# Patient Record
Sex: FEMALE | Race: OTHER | NOT HISPANIC OR LATINO | Employment: UNEMPLOYED | URBAN - METROPOLITAN AREA
[De-identification: names, ages, dates, MRNs, and addresses within clinical notes are randomized per-mention and may not be internally consistent; named-entity substitution may affect disease eponyms.]

---

## 2022-08-07 LAB
EXTERNAL ABO GROUPING: NORMAL
EXTERNAL ANTIBODY SCREEN: NORMAL
EXTERNAL HEMATOCRIT: 39.5 %
EXTERNAL HEMOGLOBIN: 13.3 G/DL
EXTERNAL HEPATITIS B SURFACE ANTIGEN: NEGATIVE
EXTERNAL HIV-1/2 AB-AG: NORMAL
EXTERNAL PLATELET COUNT: 216 K/ÂΜL
EXTERNAL RH FACTOR: POSITIVE
EXTERNAL RUBELLA IGG QUANTITATION: NORMAL
EXTERNAL SYPHILIS RPR SCREEN: NORMAL

## 2022-10-16 LAB
EXTERNAL HEMATOCRIT: 37 %
EXTERNAL HEMOGLOBIN: 12.1 G/DL
EXTERNAL PLATELET COUNT: 207 K/ÂΜL
GLUCOSE 1H P GLC SERPL-MCNC: 147 MG/DL
GLUCOSE 2H P 75 G GLC PO SERPL-MCNC: 114 MG/DL
GLUCOSE P FAST SERPL-MCNC: 89 MG/DL

## 2022-11-18 ENCOUNTER — HOSPITAL ENCOUNTER (EMERGENCY)
Facility: HOSPITAL | Age: 37
Discharge: HOME | End: 2022-11-18
Attending: EMERGENCY MEDICINE

## 2022-11-18 VITALS
SYSTOLIC BLOOD PRESSURE: 112 MMHG | OXYGEN SATURATION: 99 % | RESPIRATION RATE: 16 BRPM | TEMPERATURE: 97.3 F | DIASTOLIC BLOOD PRESSURE: 55 MMHG | HEART RATE: 85 BPM

## 2022-11-18 DIAGNOSIS — Z3A.30 30 WEEKS GESTATION OF PREGNANCY: Primary | ICD-10-CM

## 2022-11-18 LAB
BACTERIA UR QL AUTO: ABNORMAL /HPF
BILIRUB UR QL STRIP: NEGATIVE
CLARITY UR: CLEAR
COLOR UR: YELLOW
EXT PREGNANCY TEST URINE: POSITIVE
EXT. CONTROL: ABNORMAL
GLUCOSE UR STRIP-MCNC: NEGATIVE MG/DL
HGB UR QL STRIP.AUTO: NEGATIVE
KETONES UR STRIP-MCNC: NEGATIVE MG/DL
LEUKOCYTE ESTERASE UR QL STRIP: ABNORMAL
NITRITE UR QL STRIP: NEGATIVE
NON-SQ EPI CELLS URNS QL MICRO: ABNORMAL /HPF
PH UR STRIP.AUTO: 7 [PH] (ref 4.5–8)
PROT UR STRIP-MCNC: NEGATIVE MG/DL
RBC #/AREA URNS AUTO: ABNORMAL /HPF
SP GR UR STRIP.AUTO: 1.01 (ref 1–1.03)
UROBILINOGEN UR QL STRIP.AUTO: 0.2 E.U./DL
WBC #/AREA URNS AUTO: ABNORMAL /HPF

## 2022-11-18 NOTE — ED NOTES
Pt asking how much longer, she is wanting to leave - advised that per the PA we are waiting on a call from ob/gyn       Marcy Mac RN  11/18/22 1024

## 2022-11-18 NOTE — ED PROVIDER NOTES
History  Chief Complaint   Patient presents with   • Medical Problem     Pt is 29 weeks pregnant  Pt lives in [facility] with  but frequently travels here to [facility]  She needs a pregnancy test to prove to insurance she is pregnant  Denies problems with pregnancy, abdominal pain, pressure, bleeding, or leaking of fluid  Pt presents to the ED stating she needs a pregnancy test for insurance  She is 30 weeks pregnant - has no complaints  Lives some of her time in [facility]-  seeing OBGYN in [facility]  she glucose tolerance test she tested she has no headache  Patient states that she was pregnant once before  She states last year at 25 weeks she had a miscarage - baby had a congenital abnormality - not compatible with life  She states this baby does not have that  She was looking in a no bleeding or leakage of fluids no abdominal pain  A she is not vomiting  Patient has no pain  She states she just needs to prove that she is pregnant to insurance so that she can see OB  She states she already has a follow-up as an outpatient  Prior to Admission Medications   Prescriptions Last Dose Informant Patient Reported? Taking? ASPIRIN LOW DOSE PO   Yes Yes   Sig: Take by mouth Per pt Taking up until 32 weeks of pregnancy per    Calcium Carbonate Antacid (CALCIUM CARBONATE PO)   Yes Yes   Sig: Take by mouth   MAGNESIUM ASPARTATE PO   Yes Yes   Sig: Take by mouth   Prenatal Vit-Fe Fumarate-FA (PRENATAL VITAMINS PO)   Yes Yes   Sig: Take 1 tablet by mouth daily      Facility-Administered Medications: None       History reviewed  No pertinent past medical history  History reviewed  No pertinent surgical history  History reviewed  No pertinent family history  I have reviewed and agree with the history as documented      E-Cigarette/Vaping   • E-Cigarette Use Never User      E-Cigarette/Vaping Substances     Social History     Tobacco Use   • Smoking status: Never   • Smokeless tobacco: Never   Vaping Use   • Vaping Use: Never used   Substance Use Topics   • Alcohol use: Never   • Drug use: Never       Review of Systems   Gastrointestinal: Negative for abdominal pain  Genitourinary: Negative for dysuria, pelvic pain, vaginal bleeding, vaginal discharge and vaginal pain  All other systems reviewed and are negative  Physical Exam  Physical Exam  Vitals and nursing note reviewed  Constitutional:       Appearance: She is well-developed and well-nourished  HENT:      Head: Normocephalic and atraumatic  Right Ear: Tympanic membrane and external ear normal       Left Ear: Tympanic membrane and external ear normal       Mouth/Throat:      Mouth: Oropharynx is clear and moist    Eyes:      Extraocular Movements: EOM normal       Conjunctiva/sclera: Conjunctivae normal    Cardiovascular:      Rate and Rhythm: Normal rate and regular rhythm  Pulses: Intact distal pulses  Heart sounds: Normal heart sounds  Pulmonary:      Effort: Pulmonary effort is normal       Breath sounds: Normal breath sounds  Abdominal:      General: Bowel sounds are normal       Palpations: Abdomen is soft  Comments: Pt is pregnant, uterus is above umbilicus - consistent with pts report of being 30 weeks  Musculoskeletal:         General: Normal range of motion  Cervical back: Neck supple  Lymphadenopathy:      Cervical: No cervical adenopathy  Skin:     General: Skin is warm  Findings: No rash  Neurological:      Mental Status: She is alert     Psychiatric:         Mood and Affect: Mood and affect normal          Behavior: Behavior normal          Vital Signs  ED Triage Vitals [11/18/22 0920]   Temperature Pulse Respirations Blood Pressure SpO2   (!) 97 3 °F (36 3 °C) 85 16 112/55 99 %      Temp Source Heart Rate Source Patient Position - Orthostatic VS BP Location FiO2 (%)   Oral Monitor Sitting Right arm --      Pain Score       No Pain           Vitals:    11/18/22 0920   BP: 112/55   Pulse: 85 Patient Position - Orthostatic VS: Sitting         Visual Acuity      ED Medications  Medications - No data to display    Diagnostic Studies  Results Reviewed     Procedure Component Value Units Date/Time    Urine Microscopic [[id number]]  (Abnormal) Collected: 11/18/22 0950    Lab Status: Final result Specimen: Urine, Clean Catch Updated: 11/18/22 1159     RBC, UA None Seen /hpf      WBC, UA 1-2 /hpf      Epithelial Cells Occasional /hpf      Bacteria, UA Occasional /hpf     Urine culture [[id number]] Collected: 11/18/22 0950    Lab Status: In process Specimen: Urine, Clean Catch Updated: 11/18/22 1014    POCT pregnancy, urine [[id number]]  (Abnormal) Resulted: 11/18/22 1000    Lab Status: Final result Updated: 11/18/22 1000     EXT Preg Test, Ur Positive     Control Valid    Urine Macroscopic, POC [[id number]]  (Abnormal) Collected: 11/18/22 0950    Lab Status: Final result Specimen: Urine Updated: 11/18/22 0952     Color, UA Yellow     Clarity, UA Clear     pH, UA 7 0     Leukocytes, UA Trace     Nitrite, UA Negative     Protein, UA Negative mg/dl      Glucose, UA Negative mg/dl      Ketones, UA Negative mg/dl      Urobilinogen, UA 0 2 E U /dl      Bilirubin, UA Negative     Occult Blood, UA Negative     Specific Gravity, UA 1 015    Narrative:      CLINITEK RESULT                 No orders to display              Procedures  Procedures         ED Course  ED Course as of 11/18/22 1231   Fri Nov 18, 2022   1021 Discussed with DR Girma Aldana - ok to FU as out pt - nothing more needed here today  SBIRT 20yo+    Flowsheet Row Most Recent Value   SBIRT (23 yo +)    In order to provide better care to our patients, we are screening all of our patients for alcohol and drug use  Would it be okay to ask you these screening questions? Yes Filed at: 11/18/2022 1007   Initial Alcohol Screen: US AUDIT-C     1  How often do you have a drink containing alcohol? 0 Filed at: 11/18/2022 1007   2   How many drinks containing alcohol do you have on a typical day you are drinking? 0 Filed at: 11/18/2022 1007   3a  Male UNDER 65: How often do you have five or more drinks on one occasion? 0 Filed at: 11/18/2022 1007   3b  FEMALE Any Age, or MALE 65+: How often do you have 4 or more drinks on one occassion? 0 Filed at: 11/18/2022 1007   Audit-C Score 0 Filed at: 11/18/2022 1007   JACQUI: How many times in the past year have you    Used an illegal drug or used a prescription medication for non-medical reasons? Never Filed at: 11/18/2022 1007                    MDM  Number of Diagnoses or Management Options  30 weeks gestation of pregnancy: new and requires workup     Amount and/or Complexity of Data Reviewed  Clinical lab tests: reviewed  Discuss the patient with other providers: yes ( [address] [place] [address])    Risk of Complications, Morbidity, and/or Mortality  General comments: Instructions reviewed  Patient Progress  Patient progress: stable      Disposition  Final diagnoses:   30 weeks gestation of pregnancy     Time reflects when diagnosis was documented in both MDM as applicable and the Disposition within this note     Time User Action Codes Description Comment    11/18/2022  9:54 AM Candelaria Bui Add [Z3A 30] 30 weeks gestation of pregnancy       ED Disposition     ED Disposition   Discharge    Condition   Stable    Date/Time   Fri Nov 18, 2022  9:54 AM    Darrell Hunter discharge to home/self care  Follow-up Information     Follow up With Specialties Details Why Contact Info    Warren Bahena MD Obstetrics and Gynecology, Obstetrics, Gynecology   [address] [facility] [id number] [phone]            There are no discharge medications for this patient  No discharge procedures on file      PDMP Review     None          ED Provider  Electronically Signed by           Gail Harper PA-C  11/18/22 1233

## 2022-11-18 NOTE — Clinical Note
Josh Canales was seen and treated in our emergency department on 11/18/2022  Diagnosis:     Caryn Srivastava    She may return on this date:     Caryn Srivastava is pregnant - with good fetal heart  tones  If you have any questions or concerns, please don't hesitate to call        Candelaria Bui PA-C    ______________________________           _______________          _______________  Hospital Representative                              Date                                Time

## 2022-11-20 LAB — BACTERIA UR CULT: NORMAL

## 2022-11-30 ENCOUNTER — OFFICE VISIT (OUTPATIENT)
Dept: OBGYN CLINIC | Facility: CLINIC | Age: 37
End: 2022-11-30

## 2022-11-30 VITALS
SYSTOLIC BLOOD PRESSURE: 110 MMHG | BODY MASS INDEX: 29.28 KG/M2 | WEIGHT: 182.2 LBS | HEIGHT: 66 IN | DIASTOLIC BLOOD PRESSURE: 60 MMHG

## 2022-11-30 DIAGNOSIS — O28.5 ABNORMAL CHROMOSOMAL AND GENETIC FINDING ON ANTENATAL SCREENING MOTHER: ICD-10-CM

## 2022-11-30 DIAGNOSIS — Z12.4 PAP SMEAR FOR CERVICAL CANCER SCREENING: ICD-10-CM

## 2022-11-30 DIAGNOSIS — O09.523 ADVANCED MATERNAL AGE IN MULTIGRAVIDA, THIRD TRIMESTER: Primary | ICD-10-CM

## 2022-11-30 DIAGNOSIS — O34.219 HISTORY OF CESAREAN DELIVERY, ANTEPARTUM: ICD-10-CM

## 2022-11-30 DIAGNOSIS — Z23 NEED FOR INFLUENZA VACCINATION: ICD-10-CM

## 2022-11-30 DIAGNOSIS — Z23 NEED FOR TDAP VACCINATION: ICD-10-CM

## 2022-11-30 LAB — EXTERNAL CHLAMYDIA SCREEN: NEGATIVE

## 2022-11-30 NOTE — PROGRESS NOTES
Pt is a 40 y o   with Patient's last menstrual period was 2022  who presents for prenatal care  [facility] 2022  She has been receiving prenatal are in [facility] until 2022  She will be here for the duration of her pregnancy  Her LMP was normal, on time and without hormonal disruption  Pregnancy was intended and achieved spontaneously  Her PMHx is unremarkable  Her prior pregnancy was complicated by child with posterior urethral valves, anhydramnios, renal edema and brain edema  Pt had an elective c/s at 25 weeks and the fetus  shortly thereafter  This was performed in [place] and the patient does not have record of her operative report  She reports her physician counseled her this was the best course of action as the baby did not have a good chance at survival  Reviewed with patient that at 25 weeks without labor, it is unlikely that she had a low transverse incision  Advised patient to try to obtain copy of this operative report, but without out, I recommend repeat  section  Will sent for M referral to discuss the same and timing of   She denies history of genital HSV; her partner denies history of genital HSV  Her ethnic background is  [place] ; his is [place]  There is no consanguinity  There is population risk for CF-declines testing hemoglobinopathy/thalassemia-declines testing until she has insurance  Her family history is notable for nothing unusual  His is notable for a cousin with a mental disorder, but there was consanguinity  Pt reports she had trisomy screening-reports she had an NIPT  Pt presents result on her cell phone-2022 when she was 14+6 weeks  Negative for trisomy 13, 18, 21  High risk of XYY noted  7% fetal cells noted  Will refer to M for the same  Pt does accept blood products if need arises  She reports +FM  Denies vb, lof, ctx  Agreeable to tdap and influenza vaccination today       Past Medical History:   Diagnosis Date   • Canker sores oral    • Chickenpox    • Pap smear for cervical cancer screening     2015-wnl per pt; 2022   • Seasonal allergies        Past Surgical History:   Procedure Laterality Date   •  SECTION  2021     shortly after birth--elective C/S at 22 weks due to multiple medical problems with fetus  • WISDOM TOOTH EXTRACTION               Current Outpatient Medications:   •  ASPIRIN LOW DOSE PO, Take by mouth Per pt Taking up until 32 weeks of pregnancy per Cindy Denise: , Rfl:   •  Calcium Carbonate Antacid (CALCIUM CARBONATE PO), Take by mouth, Disp: , Rfl:   •  Ferrous Sulfate (IRON PO), Take by mouth, Disp: , Rfl:   •  MAGNESIUM ASPARTATE PO, Take by mouth, Disp: , Rfl:   •  Prenatal Vit-Fe Fumarate-FA (PRENATAL VITAMINS PO), Take 1 tablet by mouth daily, Disp: , Rfl:     No Known Allergies    OB History    Para Term  AB Living   2 1   1   0   SAB IAB Ectopic Multiple Live Births           1      # Outcome Date GA Lbr Santy/2nd Weight Sex Delivery Anes PTL Lv   2 Current            1  21    M CS-Unspec  N ND      Obstetric Comments   Menarche: 13-      Menses: 25-28/5-6/regular pad every 5 hours       Social History     Socioeconomic History   • Marital status: /Civil Union     Spouse name: Sherwin Johnson   • Number of children: 0   • Years of education: None   • Highest education level:  Bachelor's degree (e g , BA, AB, BS)   Occupational History   • Occupation:  in [employer]   Tobacco Use   • Smoking status: Never   • Smokeless tobacco: Former   • Tobacco comments:     Hookah prior to pregnancy   Vaping Use   • Vaping Use: Never used   Substance and Sexual Activity   • Alcohol use: Not Currently     Comment: none witih pregnancy, rarely prior   • Drug use: Never   • Sexual activity: Yes     Partners: Male     Birth control/protection: None     Comment: lifetime partners: 1   Other Topics Concern   • None   Social History Narrative Jew: [employer]     Social Determinants of Health     Financial Resource Strain: Not on file   Food Insecurity: Not on file   Transportation Needs: Not on file   Physical Activity: Not on file   Stress: Not on file   Social Connections: Not on file   Intimate Partner Violence: Not on file   Housing Stability: Not on file       Family History   Problem Relation Age of Onset   • Hypertension Mother    • Hyperlipidemia Mother    • Heart disease Father         CABG   • Supraventricular tachycardia Father         required ablation   • No Known Problems Sister    • No Known Problems Brother    • Hypertension Maternal Grandmother    • Deafness Maternal Grandmother         elderly--age 80   • Dementia Maternal Grandmother    • Hypertension Maternal Grandfather    • Prostate cancer Maternal Grandfather    • Hypertension Paternal Grandmother    • Hyperlipidemia Paternal Grandmother    • Diabetes type II Paternal Grandmother    • Heart attack Paternal Grandfather    • Stroke Paternal Grandfather         with paralysis   • Breast cancer Neg Hx    • Ovarian cancer Neg Hx    • Colon cancer Neg Hx        Review of Systems   Constitutional: Negative for chills, fatigue, fever and unexpected weight change  HENT: Negative for congestion, mouth sores and sore throat  Respiratory: Negative for cough, chest tightness, shortness of breath and wheezing  Cardiovascular: Negative for chest pain and palpitations  Gastrointestinal: Negative for abdominal distention, abdominal pain, constipation, diarrhea, nausea and vomiting  Endocrine: Negative for cold intolerance and heat intolerance  Genitourinary: Negative for dyspareunia, dysuria, genital sores, menstrual problem, pelvic pain, vaginal bleeding, vaginal discharge and vaginal pain  Musculoskeletal: Negative for arthralgias  Skin: Negative for color change and rash  Neurological: Negative for dizziness, light-headedness and headaches  Hematological: Negative for adenopathy  Blood pressure 110/60, height 5' 6" (1 676 m), weight 82 6 kg (182 lb 3 2 oz), last menstrual period 2022  and Body mass index is 29 41 kg/m²  Physical Exam  Constitutional:       General: She is not in acute distress  Appearance: Normal appearance  She is not ill-appearing  HENT:      Head: Normocephalic and atraumatic  Eyes:      Extraocular Movements: Extraocular movements intact  Conjunctiva/sclera: Conjunctivae normal    Pulmonary:      Effort: Pulmonary effort is normal    Abdominal:      General: Bowel sounds are normal  There is distension (Gravid)  Palpations: Abdomen is soft  Tenderness: There is no abdominal tenderness  There is no guarding or rebound  Hernia: No hernia is present  Comments: Fundal Height 31 cm  +FHT by doppler   Musculoskeletal:         General: Normal range of motion  Cervical back: Normal range of motion  Skin:     General: Skin is warm  Findings: No erythema or rash  Neurological:      Mental Status: She is alert and oriented to person, place, and time  Psychiatric:         Mood and Affect: Mood normal          Behavior: Behavior normal          Thought Content: Thought content normal          Judgment: Judgment normal           vulva: normal external genitalia for age and no lesions, masses, epithelial changes, or exudate  vagina: color pink and rugae  well formed rugae  cervix: nullip, no lesions , friable and pap obtained  uterus: NSSC, AF, NT, mobile and 31 cm  adnexa: not palpable       A/P: Pt is a 40 y o   with    Pt has been counseled re diet, exercise, weight gain, foods to avoid, BF, vaccines in pregnancy, trisomy screening, vector borne illness and prevention, routine dental care, travel precautions to include seat belt use and VTE risk reduction    She has been provided our pregnancy packet which includes how and when to contact providers, medication recommendations, dietary suggestions, breastfeeding information as well as websites for additional information, hospital and delivery concerns, etc  Patient also given 3d trimester packet for review  She brings her prenatal records for me to review  I will order any blood work that has not been done yet and contact patient with those orders  Debbi Fallon was seen today for routine prenatal visit  Diagnoses and all orders for this visit:    Advanced maternal age in multigravida, third trimester  -     Chlamydia/GC amplified DNA by PCR  -     Ambulatory Referral to Maternal Fetal Medicine; Future    History of  delivery, antepartum  -     Chlamydia/GC amplified DNA by PCR  -     Ambulatory Referral to Maternal Fetal Medicine; Future    Abnormal chromosomal and genetic finding on  screening mother  -     Chlamydia/GC amplified DNA by PCR  -     Ambulatory Referral to Maternal Fetal Medicine; Future    Pap smear for cervical cancer screening  -     Liquid-based pap, screening    Need for influenza vaccination  -     influenza vaccine, quadrivalent, 0 5 mL, preservative-free, for adult and pediatric patients 6 mos+ (AFLURIA, FLUARIX, FLULAVAL, FLUZONE)    Need for Tdap vaccination  -     Tdap Vaccine greater than or equal to 6yo      Review of prenatal records that patient brings reveal:   LMP 2022-->EDC 2023    Ultrasound 2022-NT 1 9 mm, Nasal bone present, CRL 12 week 4 Days, BPD 12 weeks 2  Days, Anterior placenta; Cervix 3 7 cm  (BRENDA 2023 by u/s, 12 wee 3 days; no change in [facility])    Ultrasound: -IGYLSSYO anterior, Umbilical cord wnl, LVP 3 3 cm; Fetal biometry-see scanned image, 16 weeks 5 days  Early anatomy wnl  Ultrasound 2022-Fetus measures 22 weeks 2 days, anterior placenta, LVP 5 1 cm, anatomy wnl    Ultrasound 11/10/2022-cephalic, EFW 0260T, LVP 6 cm, cervical length 4 3 cm  Umbilical dopplers wnl, MCA wnl, Fetus measures 29 weeks 4 days   Mild high resistance flow of right uterine artery  Labs:  Urine culture: 10/19/2022-no growth, U/A 10/16/2022-wnl  2 hour GTT 10/16/2022-FBG 89; 1 hour 147; 2 hour 114 (pt given 75 g glucose test)  10/16/2022-Hgb 12 1; hct 37; Plt 207  8/7/2022-FBG 85; Hgb 13 3; hct 39 5; fla592; TSH 2 09; HIV non reactive;  Hep B negative, Rubella immune, RPR non reactive, Hep C Ab non reactive; blood type A positive antibody negative; urine culture negative; vitamin D 24 5;

## 2022-12-02 LAB
HPV HR 12 DNA CVX QL NAA+PROBE: NEGATIVE
HPV16 DNA CVX QL NAA+PROBE: NEGATIVE
HPV18 DNA CVX QL NAA+PROBE: NEGATIVE

## 2022-12-03 LAB
C TRACH DNA SPEC QL NAA+PROBE: NEGATIVE
N GONORRHOEA DNA SPEC QL NAA+PROBE: NEGATIVE

## 2022-12-13 LAB
LAB AP GYN PRIMARY INTERPRETATION: NORMAL
Lab: NORMAL

## 2022-12-16 ENCOUNTER — ROUTINE PRENATAL (OUTPATIENT)
Dept: OBGYN CLINIC | Facility: CLINIC | Age: 37
End: 2022-12-16

## 2022-12-16 VITALS
SYSTOLIC BLOOD PRESSURE: 126 MMHG | BODY MASS INDEX: 30.05 KG/M2 | HEIGHT: 66 IN | WEIGHT: 187 LBS | DIASTOLIC BLOOD PRESSURE: 74 MMHG

## 2022-12-16 DIAGNOSIS — O09.523 ADVANCED MATERNAL AGE IN MULTIGRAVIDA, THIRD TRIMESTER: Primary | ICD-10-CM

## 2022-12-16 DIAGNOSIS — O28.5 ABNORMAL CHROMOSOMAL AND GENETIC FINDING ON ANTENATAL SCREENING MOTHER: ICD-10-CM

## 2022-12-16 DIAGNOSIS — O34.219 HISTORY OF CESAREAN DELIVERY, ANTEPARTUM: ICD-10-CM

## 2022-12-16 NOTE — PROGRESS NOTES
41 yo  at 35w gestation  Late transfer of care from [facility]  AMA ,Hx of poor OB outcome (anomalies w/ c/s at 25w, fetal demise); High risk of XYY noted on NIPT  She has a copy of what her OB from [place] wrote regarding her prior   She translated it to English:  "Uterine incision was crescent in the lower segment, but at this gestation age it is not formed as required  It is possible to undergo a natural birth experience, provided the labor is not violent and it is preferrable to resort to  section when there is any defect in the development of labor "  I requested she forward doctor Pennie Rizo the original document via [employer]  Baby is active, denies leakage of fluid, vaginal bleeding or uterine contractions  S=D, normal FHTs, normal BP    To stop ASA in one week  Patient is inquiring about delivery planning/ date  Advised that Dr Pennie Rizo will clarify this with her after reviewing  information from her prior pregnancy  RV one week-- GBS at that time

## 2022-12-19 ENCOUNTER — ROUTINE PRENATAL (OUTPATIENT)
Facility: HOSPITAL | Age: 37
End: 2022-12-19
Attending: OBSTETRICS & GYNECOLOGY

## 2022-12-19 VITALS
SYSTOLIC BLOOD PRESSURE: 116 MMHG | BODY MASS INDEX: 30.18 KG/M2 | WEIGHT: 187.8 LBS | DIASTOLIC BLOOD PRESSURE: 54 MMHG | HEIGHT: 66 IN

## 2022-12-19 DIAGNOSIS — O34.219 HISTORY OF CESAREAN DELIVERY, ANTEPARTUM: ICD-10-CM

## 2022-12-19 DIAGNOSIS — Z3A.33 33 WEEKS GESTATION OF PREGNANCY: Primary | ICD-10-CM

## 2022-12-19 DIAGNOSIS — O28.5 ABNORMAL CHROMOSOMAL AND GENETIC FINDING ON ANTENATAL SCREENING MOTHER: ICD-10-CM

## 2022-12-19 DIAGNOSIS — O09.523 ADVANCED MATERNAL AGE IN MULTIGRAVIDA, THIRD TRIMESTER: ICD-10-CM

## 2022-12-19 DIAGNOSIS — Z36.89 ENCOUNTER FOR FETAL ANATOMIC SURVEY: ICD-10-CM

## 2022-12-19 PROBLEM — Z3A.35 35 WEEKS GESTATION OF PREGNANCY: Status: ACTIVE | Noted: 2022-12-19

## 2022-12-19 NOTE — LETTER
2022     Maria Elena Murphy, [address] [address]    Patient: Nina Howard   YOB: 1985   Date of Visit: 2022       Dear Dr Radha Delgado and Deepak Alex,    Thank you for referring Nina Howard to me for evaluation  Below are my notes for this consultation  If you have questions, please do not hesitate to call me  I look forward to following your patient along with you  Sincerely,        Jennie Gardner MD        CC: PETRA Bazan MD  2022  4:57 PM  Sign when Signing Visit  [address]: Ms Jaclyn Landon was seen today at 33w4d for anatomic survey ultrasound  See ultrasound report under "OB Procedures" tab  My recommendations are as follows:  1  Although encouraging, even a normal-appearing ultrasound cannot exclude all malformations, or the possibility of a genetic syndrome  Post-enoc evaluation is advised by the pediatrician regarding the abnormal NIPS result  No further ultrasounds were scheduled at this time  Please refer her back to our office as clinically indicated  2  Regarding her prior  , the language in the operative report as well as the gestational age at delivery suggest extension of the hysterotomy into the active segment of the uterus  I recommend delivery via repeat  between 36 0/7 and 37 0/7 weeks gestation, analogous to a patient with a prior classical   If delivery is planned prior to 37 weeks, late  steroids should be offered, but are not required to proceed with delivery prior to 37w0d       Please don't hesitate to contact our office with any concerns or questions     -Jennie Gardner MD

## 2022-12-19 NOTE — PROGRESS NOTES
[address] [place] [address]: Ms Eunice Payan was seen today at 33w4d for anatomic survey ultrasound  See ultrasound report under "OB Procedures" tab  My recommendations are as follows:  1  Although encouraging, even a normal-appearing ultrasound cannot exclude all malformations, or the possibility of a genetic syndrome  Post- evaluation is advised by the pediatrician regarding the abnormal NIPS result  No further ultrasounds were scheduled at this time  Please refer her back to our office as clinically indicated  2  Regarding her prior  , the language in the operative report as well as the gestational age at delivery suggest extension of the hysterotomy into the active segment of the uterus  I recommend delivery via repeat  between 36 0/7 and 37 0/7 weeks gestation, analogous to a patient with a prior classical   If delivery is planned prior to 37 weeks, late  steroids should be offered, but are not required to proceed with delivery prior to 37w0d       Please don't hesitate to contact our office with any concerns or questions     -Sofy Stafford MD

## 2022-12-30 ENCOUNTER — ROUTINE PRENATAL (OUTPATIENT)
Dept: OBGYN CLINIC | Facility: CLINIC | Age: 37
End: 2022-12-30

## 2022-12-30 VITALS
SYSTOLIC BLOOD PRESSURE: 112 MMHG | WEIGHT: 191.2 LBS | BODY MASS INDEX: 30.73 KG/M2 | HEIGHT: 66 IN | DIASTOLIC BLOOD PRESSURE: 62 MMHG

## 2022-12-30 DIAGNOSIS — Z3A.35 35 WEEKS GESTATION OF PREGNANCY: ICD-10-CM

## 2022-12-30 DIAGNOSIS — O34.219 HISTORY OF CESAREAN DELIVERY, ANTEPARTUM: ICD-10-CM

## 2022-12-30 DIAGNOSIS — O09.523 ADVANCED MATERNAL AGE IN MULTIGRAVIDA, THIRD TRIMESTER: Primary | ICD-10-CM

## 2022-12-30 DIAGNOSIS — O28.5 ABNORMAL CHROMOSOMAL AND GENETIC FINDING ON ANTENATAL SCREENING MOTHER: ICD-10-CM

## 2022-12-30 NOTE — PROGRESS NOTES
39 yo  at 35w1d gestation  Late transfer of care from [facility]  AMA ,Hx of poor OB outcome (anomalies w/ c/s at 25w, fetal demise); High risk of XYY noted on NIPT  Notes an increase in discharge  Feels "numbness" in pubic area-- I checked area and she has some vulvar varicosities; reassured  Having a lot cramps that are irregular  Baby is active, no bleeding  S=D, normal FHTs, nl BP    GBS obtained  Pt needs repeat c/s 36-37 weeks gestation due to prior c/s at 25 weeks with operative note stating GAURAV was not well developed   has been scheduled for 23    RV one week

## 2023-01-01 LAB — GP B STREP DNA SPEC QL NAA+PROBE: NEGATIVE

## 2023-01-06 ENCOUNTER — ROUTINE PRENATAL (OUTPATIENT)
Dept: OBGYN CLINIC | Facility: CLINIC | Age: 38
End: 2023-01-06

## 2023-01-06 VITALS
HEIGHT: 66 IN | SYSTOLIC BLOOD PRESSURE: 118 MMHG | HEART RATE: 94 BPM | BODY MASS INDEX: 31.18 KG/M2 | DIASTOLIC BLOOD PRESSURE: 70 MMHG | WEIGHT: 194 LBS

## 2023-01-06 DIAGNOSIS — Z01.818 PREOPERATIVE TESTING: Primary | ICD-10-CM

## 2023-01-06 DIAGNOSIS — O28.5 ABNORMAL CHROMOSOMAL AND GENETIC FINDING ON ANTENATAL SCREENING MOTHER: ICD-10-CM

## 2023-01-06 DIAGNOSIS — Z3A.36 36 WEEKS GESTATION OF PREGNANCY: ICD-10-CM

## 2023-01-06 DIAGNOSIS — O34.219 HISTORY OF CESAREAN DELIVERY, ANTEPARTUM: ICD-10-CM

## 2023-01-06 DIAGNOSIS — O09.523 ADVANCED MATERNAL AGE IN MULTIGRAVIDA, THIRD TRIMESTER: ICD-10-CM

## 2023-01-06 NOTE — PROGRESS NOTES
Pt is a 40 y o   36w1d  Pregnancy is complicated by  AMA ,Hx of poor OB outcome (anomalies w/ c/s at 27w, fetal demise); High risk of XYY noted on NIPT  Pt reports +FM  Denies vb, lof, ctx   PTL precautions and fkc reviewed  Due to mid transverse incision at previous c/s--C/S scheduled on 2023 at 9 am  Pt instructed to arrive at 7 am  No food or drink after midnight the evening prior  Preop lab slips given to patient  Delivery consent reviewed and signed  Called [facility] while pt present and scheduled BMZ for FLM on Monday and Tuesday next week on  and 1/10

## 2023-01-08 ENCOUNTER — APPOINTMENT (OUTPATIENT)
Dept: LAB | Facility: HOSPITAL | Age: 38
End: 2023-01-08

## 2023-01-08 DIAGNOSIS — Z01.818 PREOPERATIVE TESTING: Primary | ICD-10-CM

## 2023-01-08 LAB
ABO GROUP BLD: NORMAL
BLD GP AB SCN SERPL QL: NEGATIVE
ERYTHROCYTE [DISTWIDTH] IN BLOOD BY AUTOMATED COUNT: 13.8 % (ref 11.6–15.1)
HCT VFR BLD AUTO: 36.5 % (ref 34.8–46.1)
HGB BLD-MCNC: 12.3 G/DL (ref 11.5–15.4)
MCH RBC QN AUTO: 31.7 PG (ref 26.8–34.3)
MCHC RBC AUTO-ENTMCNC: 33.7 G/DL (ref 31.4–37.4)
MCV RBC AUTO: 94 FL (ref 82–98)
PLATELET # BLD AUTO: 158 THOUSANDS/UL (ref 149–390)
PMV BLD AUTO: 11.1 FL (ref 8.9–12.7)
RBC # BLD AUTO: 3.88 MILLION/UL (ref 3.81–5.12)
RH BLD: POSITIVE
SPECIMEN EXPIRATION DATE: NORMAL
WBC # BLD AUTO: 14.08 THOUSAND/UL (ref 4.31–10.16)

## 2023-01-09 ENCOUNTER — ANESTHESIA EVENT (INPATIENT)
Dept: LABOR AND DELIVERY | Facility: HOSPITAL | Age: 38
End: 2023-01-09

## 2023-01-09 ENCOUNTER — CLINICAL SUPPORT (OUTPATIENT)
Dept: PERINATAL CARE | Facility: OTHER | Age: 38
End: 2023-01-09

## 2023-01-09 VITALS
DIASTOLIC BLOOD PRESSURE: 60 MMHG | HEART RATE: 87 BPM | SYSTOLIC BLOOD PRESSURE: 102 MMHG | HEIGHT: 66 IN | WEIGHT: 195 LBS | BODY MASS INDEX: 31.34 KG/M2

## 2023-01-09 DIAGNOSIS — Z3A.36 36 WEEKS GESTATION OF PREGNANCY: Primary | ICD-10-CM

## 2023-01-09 DIAGNOSIS — Z98.891 HISTORY OF CLASSICAL CESAREAN SECTION: ICD-10-CM

## 2023-01-09 LAB — RPR SER QL: NORMAL

## 2023-01-09 RX ORDER — BETAMETHASONE SODIUM PHOSPHATE AND BETAMETHASONE ACETATE 3; 3 MG/ML; MG/ML
12 INJECTION, SUSPENSION INTRA-ARTICULAR; INTRALESIONAL; INTRAMUSCULAR; SOFT TISSUE EVERY 24 HOURS
Status: DISCONTINUED | OUTPATIENT
Start: 2023-01-09 | End: 2023-01-10

## 2023-01-09 RX ADMIN — BETAMETHASONE SODIUM PHOSPHATE AND BETAMETHASONE ACETATE 12 MG: 3; 3 INJECTION, SUSPENSION INTRA-ARTICULAR; INTRALESIONAL; INTRAMUSCULAR at 14:20

## 2023-01-09 NOTE — PROGRESS NOTES
First Beta injection was performed 12 mg of Betamethasone R Ventrogluteal  This was well tolerated by patient  Aware to call with any S/E or concerns  Patient is scheduled for 2nd dose tomorrow 1/10/23

## 2023-01-10 ENCOUNTER — CLINICAL SUPPORT (OUTPATIENT)
Dept: PERINATAL CARE | Facility: OTHER | Age: 38
End: 2023-01-10

## 2023-01-10 VITALS
SYSTOLIC BLOOD PRESSURE: 120 MMHG | HEART RATE: 88 BPM | WEIGHT: 195 LBS | DIASTOLIC BLOOD PRESSURE: 64 MMHG | BODY MASS INDEX: 31.47 KG/M2

## 2023-01-10 DIAGNOSIS — Z3A.36 36 WEEKS GESTATION OF PREGNANCY: Primary | ICD-10-CM

## 2023-01-10 DIAGNOSIS — O34.219 HISTORY OF CESAREAN DELIVERY, ANTEPARTUM: ICD-10-CM

## 2023-01-10 RX ADMIN — BETAMETHASONE SODIUM PHOSPHATE AND BETAMETHASONE ACETATE 12 MG: 3; 3 INJECTION, SUSPENSION INTRA-ARTICULAR; INTRALESIONAL; INTRAMUSCULAR at 11:55

## 2023-01-10 NOTE — PROGRESS NOTES
Discussed with patient her MFM provider prescribed Betamethasone (  steroid)  Betamethasone dose of 12 mg is given in two injections approximately 24 hours apart  Some common side effects of Betamethasone include: burning, itching, increase in blood sugar levels, trembling, weakness, fast heart beat, mood changes or pain /redness at injection site  Patient advised to seek emergency help if she experiences any signs or symptoms of an allergic reaction : hives, difficulty breathing, swelling of face, lips, tongue or throat    Patient advised to contact her OB if she notices any changes in pregnancy or fetal movements ( kick counts)    Patient verbalized understanding of all information  Betamethasone injection given L ventrogluteal site

## 2023-01-11 ENCOUNTER — HOSPITAL ENCOUNTER (INPATIENT)
Facility: HOSPITAL | Age: 38
LOS: 2 days | Discharge: HOME | End: 2023-01-13
Attending: OBSTETRICS & GYNECOLOGY | Admitting: OBSTETRICS & GYNECOLOGY

## 2023-01-11 ENCOUNTER — ANESTHESIA (INPATIENT)
Dept: LABOR AND DELIVERY | Facility: HOSPITAL | Age: 38
End: 2023-01-11

## 2023-01-11 DIAGNOSIS — Z3A.36 36 WEEKS GESTATION OF PREGNANCY: ICD-10-CM

## 2023-01-11 DIAGNOSIS — O34.219 HISTORY OF CESAREAN DELIVERY, ANTEPARTUM: ICD-10-CM

## 2023-01-11 DIAGNOSIS — Z98.891 S/P REPEAT LOW TRANSVERSE C-SECTION: Primary | ICD-10-CM

## 2023-01-11 LAB
BASE EXCESS BLDCOA CALC-SCNC: -0.3 MMOL/L (ref 3–11)
BASE EXCESS BLDCOV CALC-SCNC: -0.5 MMOL/L (ref 1–9)
DME PARACHUTE DELIVERY DATE REQUESTED: NORMAL
DME PARACHUTE ITEM DESCRIPTION: NORMAL
DME PARACHUTE ORDER STATUS: NORMAL
DME PARACHUTE SUPPLIER NAME: NORMAL
DME PARACHUTE SUPPLIER PHONE: NORMAL
HCO3 BLDCOA-SCNC: 25.6 MMOL/L (ref 17.3–27.3)
HCO3 BLDCOV-SCNC: 24.4 MMOL/L (ref 12.2–28.6)
O2 CT VFR BLDCOA CALC: 9.4 ML/DL
OXYHGB MFR BLDCOA: 48 %
OXYHGB MFR BLDCOV: 69.8 %
PCO2 BLDCOA: 46.4 MM[HG] (ref 30–60)
PCO2 BLDCOV: 41.2 MM HG (ref 27–43)
PH BLDCOA: 7.36 [PH] (ref 7.23–7.43)
PH BLDCOV: 7.39 [PH] (ref 7.19–7.49)
PO2 BLDCOA: 21.3 MM HG (ref 5–25)
PO2 BLDCOV: 28.7 MM HG (ref 15–45)
SAO2 % BLDCOV: 13.3 ML/DL

## 2023-01-11 PROCEDURE — 4A1HXCZ MONITORING OF PRODUCTS OF CONCEPTION, CARDIAC RATE, EXTERNAL APPROACH: ICD-10-PCS | Performed by: OBSTETRICS & GYNECOLOGY

## 2023-01-11 RX ORDER — MIDAZOLAM HYDROCHLORIDE 2 MG/2ML
INJECTION, SOLUTION INTRAMUSCULAR; INTRAVENOUS
Status: COMPLETED
Start: 2023-01-11 | End: 2023-01-11

## 2023-01-11 RX ORDER — CALCIUM CARBONATE 200(500)MG
1000 TABLET,CHEWABLE ORAL DAILY PRN
Status: DISCONTINUED | OUTPATIENT
Start: 2023-01-11 | End: 2023-01-13 | Stop reason: HOSPADM

## 2023-01-11 RX ORDER — OXYTOCIN/RINGER'S LACTATE 30/500 ML
PLASTIC BAG, INJECTION (ML) INTRAVENOUS
Status: COMPLETED
Start: 2023-01-11 | End: 2023-01-11

## 2023-01-11 RX ORDER — CEFAZOLIN SODIUM 2 G/50ML
2000 SOLUTION INTRAVENOUS ONCE
Status: COMPLETED | OUTPATIENT
Start: 2023-01-11 | End: 2023-01-11

## 2023-01-11 RX ORDER — ONDANSETRON 2 MG/ML
4 INJECTION INTRAMUSCULAR; INTRAVENOUS ONCE AS NEEDED
Status: DISCONTINUED | OUTPATIENT
Start: 2023-01-11 | End: 2023-01-11

## 2023-01-11 RX ORDER — MORPHINE SULFATE 1 MG/ML
INJECTION, SOLUTION EPIDURAL; INTRATHECAL; INTRAVENOUS AS NEEDED
Status: DISCONTINUED | OUTPATIENT
Start: 2023-01-11 | End: 2023-01-11

## 2023-01-11 RX ORDER — FENTANYL CITRATE/PF 50 MCG/ML
25 SYRINGE (ML) INJECTION
Status: DISCONTINUED | OUTPATIENT
Start: 2023-01-11 | End: 2023-01-11

## 2023-01-11 RX ORDER — NALOXONE HYDROCHLORIDE 0.4 MG/ML
0.1 INJECTION, SOLUTION INTRAMUSCULAR; INTRAVENOUS; SUBCUTANEOUS
Status: ACTIVE | OUTPATIENT
Start: 2023-01-11 | End: 2023-01-12

## 2023-01-11 RX ORDER — KETAMINE HCL IN NACL, ISO-OSM 100MG/10ML
SYRINGE (ML) INJECTION
Status: COMPLETED
Start: 2023-01-11 | End: 2023-01-11

## 2023-01-11 RX ORDER — PHENYLEPHRINE HCL IN 0.9% NACL 1 MG/10 ML
SYRINGE (ML) INTRAVENOUS
Status: DISPENSED
Start: 2023-01-11 | End: 2023-01-11

## 2023-01-11 RX ORDER — SODIUM CHLORIDE, SODIUM LACTATE, POTASSIUM CHLORIDE, CALCIUM CHLORIDE 600; 310; 30; 20 MG/100ML; MG/100ML; MG/100ML; MG/100ML
125 INJECTION, SOLUTION INTRAVENOUS CONTINUOUS
Status: DISCONTINUED | OUTPATIENT
Start: 2023-01-11 | End: 2023-01-11

## 2023-01-11 RX ORDER — OXYTOCIN/RINGER'S LACTATE 30/500 ML
PLASTIC BAG, INJECTION (ML) INTRAVENOUS CONTINUOUS PRN
Status: DISCONTINUED | OUTPATIENT
Start: 2023-01-11 | End: 2023-01-11

## 2023-01-11 RX ORDER — OXYCODONE HYDROCHLORIDE 5 MG/1
5 TABLET ORAL EVERY 4 HOURS PRN
Status: DISCONTINUED | OUTPATIENT
Start: 2023-01-11 | End: 2023-01-13 | Stop reason: HOSPADM

## 2023-01-11 RX ORDER — KETOROLAC TROMETHAMINE 30 MG/ML
INJECTION, SOLUTION INTRAMUSCULAR; INTRAVENOUS AS NEEDED
Status: DISCONTINUED | OUTPATIENT
Start: 2023-01-11 | End: 2023-01-11

## 2023-01-11 RX ORDER — ONDANSETRON 2 MG/ML
INJECTION INTRAMUSCULAR; INTRAVENOUS AS NEEDED
Status: DISCONTINUED | OUTPATIENT
Start: 2023-01-11 | End: 2023-01-11

## 2023-01-11 RX ORDER — ONDANSETRON 2 MG/ML
4 INJECTION INTRAMUSCULAR; INTRAVENOUS EVERY 8 HOURS PRN
Status: DISCONTINUED | OUTPATIENT
Start: 2023-01-11 | End: 2023-01-11

## 2023-01-11 RX ORDER — OXYTOCIN/RINGER'S LACTATE 30/500 ML
PLASTIC BAG, INJECTION (ML) INTRAVENOUS
Status: DISPENSED
Start: 2023-01-11 | End: 2023-01-11

## 2023-01-11 RX ORDER — DIAPER,BRIEF,INFANT-TODD,DISP
1 EACH MISCELLANEOUS DAILY PRN
Status: DISCONTINUED | OUTPATIENT
Start: 2023-01-11 | End: 2023-01-13 | Stop reason: HOSPADM

## 2023-01-11 RX ORDER — TRANEXAMIC ACID 100 MG/ML
INJECTION, SOLUTION INTRAVENOUS
Status: DISPENSED
Start: 2023-01-11 | End: 2023-01-11

## 2023-01-11 RX ORDER — FENTANYL CITRATE 50 UG/ML
INJECTION, SOLUTION INTRAMUSCULAR; INTRAVENOUS
Status: COMPLETED
Start: 2023-01-11 | End: 2023-01-11

## 2023-01-11 RX ORDER — OXYTOCIN/RINGER'S LACTATE 30/500 ML
62.5 PLASTIC BAG, INJECTION (ML) INTRAVENOUS ONCE
Status: DISCONTINUED | OUTPATIENT
Start: 2023-01-11 | End: 2023-01-13 | Stop reason: HOSPADM

## 2023-01-11 RX ORDER — ONDANSETRON 2 MG/ML
INJECTION INTRAMUSCULAR; INTRAVENOUS
Status: COMPLETED
Start: 2023-01-11 | End: 2023-01-11

## 2023-01-11 RX ORDER — MIDAZOLAM HYDROCHLORIDE 2 MG/2ML
INJECTION, SOLUTION INTRAMUSCULAR; INTRAVENOUS AS NEEDED
Status: DISCONTINUED | OUTPATIENT
Start: 2023-01-11 | End: 2023-01-11

## 2023-01-11 RX ORDER — MORPHINE SULFATE 0.5 MG/ML
INJECTION, SOLUTION EPIDURAL; INTRATHECAL; INTRAVENOUS
Status: DISPENSED
Start: 2023-01-11 | End: 2023-01-11

## 2023-01-11 RX ORDER — BUPIVACAINE HYDROCHLORIDE 7.5 MG/ML
INJECTION, SOLUTION INTRASPINAL AS NEEDED
Status: DISCONTINUED | OUTPATIENT
Start: 2023-01-11 | End: 2023-01-11

## 2023-01-11 RX ORDER — ONDANSETRON 2 MG/ML
4 INJECTION INTRAMUSCULAR; INTRAVENOUS EVERY 8 HOURS PRN
Status: DISCONTINUED | OUTPATIENT
Start: 2023-01-11 | End: 2023-01-13 | Stop reason: HOSPADM

## 2023-01-11 RX ORDER — SENNOSIDES 8.6 MG
1 TABLET ORAL
Status: DISCONTINUED | OUTPATIENT
Start: 2023-01-11 | End: 2023-01-13 | Stop reason: HOSPADM

## 2023-01-11 RX ORDER — SODIUM CHLORIDE, SODIUM LACTATE, POTASSIUM CHLORIDE, CALCIUM CHLORIDE 600; 310; 30; 20 MG/100ML; MG/100ML; MG/100ML; MG/100ML
125 INJECTION, SOLUTION INTRAVENOUS CONTINUOUS
Status: DISCONTINUED | OUTPATIENT
Start: 2023-01-11 | End: 2023-01-13 | Stop reason: HOSPADM

## 2023-01-11 RX ORDER — ACETAMINOPHEN 325 MG/1
650 TABLET ORAL EVERY 6 HOURS SCHEDULED
Status: DISCONTINUED | OUTPATIENT
Start: 2023-01-11 | End: 2023-01-13 | Stop reason: HOSPADM

## 2023-01-11 RX ORDER — EPHEDRINE SULFATE 50 MG/ML
INJECTION INTRAVENOUS
Status: DISPENSED
Start: 2023-01-11 | End: 2023-01-11

## 2023-01-11 RX ORDER — OXYCODONE HYDROCHLORIDE 5 MG/1
10 TABLET ORAL EVERY 4 HOURS PRN
Status: DISCONTINUED | OUTPATIENT
Start: 2023-01-11 | End: 2023-01-13 | Stop reason: HOSPADM

## 2023-01-11 RX ORDER — DOCUSATE SODIUM 100 MG/1
100 CAPSULE, LIQUID FILLED ORAL 2 TIMES DAILY
Status: DISCONTINUED | OUTPATIENT
Start: 2023-01-11 | End: 2023-01-13 | Stop reason: HOSPADM

## 2023-01-11 RX ORDER — FENTANYL CITRATE 50 UG/ML
INJECTION, SOLUTION INTRAMUSCULAR; INTRAVENOUS AS NEEDED
Status: DISCONTINUED | OUTPATIENT
Start: 2023-01-11 | End: 2023-01-11

## 2023-01-11 RX ORDER — KETOROLAC TROMETHAMINE 30 MG/ML
INJECTION, SOLUTION INTRAMUSCULAR; INTRAVENOUS
Status: COMPLETED
Start: 2023-01-11 | End: 2023-01-11

## 2023-01-11 RX ORDER — KETAMINE HCL IN NACL, ISO-OSM 100MG/10ML
SYRINGE (ML) INJECTION AS NEEDED
Status: DISCONTINUED | OUTPATIENT
Start: 2023-01-11 | End: 2023-01-11

## 2023-01-11 RX ORDER — SIMETHICONE 80 MG
80 TABLET,CHEWABLE ORAL 4 TIMES DAILY PRN
Status: DISCONTINUED | OUTPATIENT
Start: 2023-01-11 | End: 2023-01-13 | Stop reason: HOSPADM

## 2023-01-11 RX ADMIN — MIDAZOLAM 1 MG: 1 INJECTION INTRAMUSCULAR; INTRAVENOUS at 11:46

## 2023-01-11 RX ADMIN — Medication 20 MG: at 11:41

## 2023-01-11 RX ADMIN — KETOROLAC TROMETHAMINE 30 MG: 30 INJECTION, SOLUTION INTRAMUSCULAR at 12:03

## 2023-01-11 RX ADMIN — ONDANSETRON 4 MG: 2 INJECTION INTRAMUSCULAR; INTRAVENOUS at 10:58

## 2023-01-11 RX ADMIN — Medication 10 MG: at 11:53

## 2023-01-11 RX ADMIN — Medication 250 MILLI-UNITS/MIN: at 11:28

## 2023-01-11 RX ADMIN — ACETAMINOPHEN 650 MG: 325 TABLET ORAL at 20:50

## 2023-01-11 RX ADMIN — CEFAZOLIN SODIUM 2000 MG: 2 SOLUTION INTRAVENOUS at 10:52

## 2023-01-11 RX ADMIN — SODIUM CHLORIDE, SODIUM LACTATE, POTASSIUM CHLORIDE, AND CALCIUM CHLORIDE 125 ML/HR: 600; 310; 30; 20 INJECTION, SOLUTION INTRAVENOUS at 09:55

## 2023-01-11 RX ADMIN — FENTANYL CITRATE 15 MCG: 50 INJECTION INTRAMUSCULAR; INTRAVENOUS at 11:03

## 2023-01-11 RX ADMIN — SODIUM CHLORIDE, SODIUM LACTATE, POTASSIUM CHLORIDE, AND CALCIUM CHLORIDE: 600; 310; 30; 20 INJECTION, SOLUTION INTRAVENOUS at 11:32

## 2023-01-11 RX ADMIN — ACETAMINOPHEN 650 MG: 325 TABLET ORAL at 13:51

## 2023-01-11 RX ADMIN — Medication 10 MG: at 12:00

## 2023-01-11 RX ADMIN — Medication 250 MILLI-UNITS/MIN: at 11:47

## 2023-01-11 RX ADMIN — DOCUSATE SODIUM 100 MG: 100 CAPSULE ORAL at 13:51

## 2023-01-11 RX ADMIN — BUPIVACAINE HYDROCHLORIDE IN DEXTROSE 1.3 ML: 7.5 INJECTION, SOLUTION SUBARACHNOID at 11:03

## 2023-01-11 RX ADMIN — SODIUM CHLORIDE, SODIUM LACTATE, POTASSIUM CHLORIDE, AND CALCIUM CHLORIDE: 600; 310; 30; 20 INJECTION, SOLUTION INTRAVENOUS at 12:09

## 2023-01-11 RX ADMIN — MIDAZOLAM 1 MG: 1 INJECTION INTRAMUSCULAR; INTRAVENOUS at 11:41

## 2023-01-11 RX ADMIN — Medication 10 MG: at 11:46

## 2023-01-11 RX ADMIN — MORPHINE SULFATE 0.1 MG: 1 INJECTION, SOLUTION EPIDURAL; INTRATHECAL; INTRAVENOUS at 11:03

## 2023-01-11 NOTE — ANESTHESIA PREPROCEDURE EVALUATION
Procedure:   SECTION () REPEAT (Uterus)    Relevant Problems   ANESTHESIA (within normal limits)      CARDIO (within normal limits)      GYN   (+) 36 weeks gestation of pregnancy   (+) Advanced maternal age in multigravida, third trimester      PULMONARY (within normal limits)        Physical Exam    Airway    Mallampati score: II  TM Distance: >3 FB  Neck ROM: full     Dental   No notable dental hx     Cardiovascular  Rhythm: regular, Rate: normal, Cardiovascular exam normal    Pulmonary  Pulmonary exam normal Breath sounds clear to auscultation,     Other Findings        Anesthesia Plan  ASA Score- 2     Anesthesia Type- spinal with ASA Monitors  Additional Monitors:   Airway Plan:           Plan Factors-    Chart reviewed  Existing labs reviewed  Patient summary reviewed  Patient is not a current smoker  Induction-     Postoperative Plan-     Informed Consent- Anesthetic plan and risks discussed with patient

## 2023-01-11 NOTE — ANESTHESIA PROCEDURE NOTES
Spinal Block    Patient location during procedure: OR  Start time: 1/11/2023 11:03 AM  Reason for block: procedure for pain and at surgeon's request  Staffing  Performed: Anesthesiologist   Anesthesiologist: Marta Whitehead DO  Preanesthetic Checklist  Completed: patient identified, IV checked, site marked, risks and benefits discussed, surgical consent, monitors and equipment checked, pre-op evaluation and timeout performed  Spinal Block  Patient position: sitting  Prep: ChloraPrep  Patient monitoring: cardiac monitor and frequent blood pressure checks  Approach: midline  Location: L3-4  Injection technique: single-shot  Needle  Needle type: pencil-tip   Needle gauge: 25 G  Needle length: 10 cm  Assessment  Sensory level: T4  Injection Assessment:  negative aspiration for heme, no paresthesia on injection and positive aspiration for clear CSF

## 2023-01-11 NOTE — DISCHARGE SUMMARY
Obstetrics Discharge Summary   Sharad Brown 40 y o  female MRN: [id number]  Unit/Bed#: L&D 305-01 Encounter: [id number]    Admission Date: 2023     Discharge Date: 2023    Admitting Diagnoses:   Pregnancy at 36w6d  Previous  section with mid-transverse incision    Discharge Diagnoses:   Same, delivered      Procedures:   Repeat  section, mid-transverse incision      Admitting Attending: Carmen Rodriguez  Delivery Attending: Carmen Rodriguez  Discharge Attending: [facility] Course:   Sharad Brown is now a 40 y o   who was initially admitted at 36w6d for scheduled repeat  section  She underwent an uncomplicated repeat low transverse  section delivery on 2023 and delivered a viable male  at 12  APGARS were 8, 9 at 1 and 5 minutes, respectively  's birth weight was 5lb 15 6oz  Placenta delivered without difficulty   was admitted to the  nursery  Patient tolerated the procedure well and was transferred to recovery in stable condition  The patient's post partum course was unremarkable    Her preoperative hemoglobin was 12 3 g/dL, postoperative was 10 5  Her postoperative pain was well controlled with oral analgesics  On day of discharge, she was ambulating and able to reasonably perform all ADLs  She was voiding and had appropriate bowel function  Pain was well controlled  She was discharged home on post-operative day #2 without complications  Patient was instructed to follow up with her OB as an outpatient and was given appropriate warnings to call provider if she develops signs of infection or uncontrolled pain  Mom's blood type is A positive, so RhoGAM was not indicated      Complications:   None    Condition at discharge:   good     Provisions for Follow-Up Care:  See after visit summary for information related to follow-up care and any pertinent home health orders        Disposition:   Home    Planned Readmission: No    Discharge Medications:   Please see AVS for a complete list of discharge medications  Discharge instructions :   Please see AVS for complete discharge instructions

## 2023-01-11 NOTE — ANESTHESIA POSTPROCEDURE EVALUATION
Post-Op Assessment Note    CV Status:  Stable    Pain management: adequate     Mental Status:  Alert and awake   Hydration Status:  Euvolemic   PONV Controlled:  Controlled   Airway Patency:  Patent   Two or more mitigation strategies used for obstructive sleep apnea   Post Op Vitals Reviewed: Yes      Staff: Anesthesiologist, CRNA         No notable events documented      BP      Temp      Pulse     Resp      SpO2      /51 (BP Location: Left arm)   Pulse 78   Temp 98 1 °F (36 7 °C) (Oral)   Resp 16   Ht 5' 6" (1 676 m)   Wt 88 5 kg (195 lb)   LMP 04/28/2022 (Exact Date)   SpO2 97%   Breastfeeding Unknown   BMI 31 47 kg/m²

## 2023-01-11 NOTE — LACTATION NOTE
This note was copied from a baby's chart  CONSULT - LACTATION  Baby Boy Glorious Martínez) Sindy 0 days male MRN: [id number]    [facility] Room / Bed: L&D 305(N)/L&D 305(N) Encounter: [id number]    Maternal Information     MOTHER:  Steph Callahan  Maternal Age: 40 y o    OB History: # 1 - Date: 21, Sex: Male, Weight: None, GA: None, Delivery: , Unspecified, Apgar1: None, Apgar5: None, Living:  Demise, Birth Comments: None    # 2 - Date: None, Sex: None, Weight: None, GA: None, Delivery: None, Apgar1: None, Apgar5: None, Living: None, Birth Comments: None   Previouse breast reduction surgery? No    Lactation history:   Has patient previously breast fed: No   How long had patient previously breast fed:     Previous breast feeding complications:       Past Surgical History:   Procedure Laterality Date   •  SECTION  2021     shortly after birth--elective C/S at [address] [place] [address] due to multiple medical problems with fetus  • WISDOM TOOTH EXTRACTION              Birth information:  YOB: 2022   Time of birth: 11:28 AM   Sex: male   Delivery type:     Birth Weight: 2710 g (5 lb 15 6 oz)   Percent of Weight Change: 0%     Gestational Age: 36w7d   [unfilled]    Assessment     Breast and nipple assessment: normal assessment     Assessment: normal assessment    Feeding assessment: feeding well  LATCH:  Latch: Grasps breast, tongue down, lips flanged, rhythmic sucking   Audible Swallowing: Spontaneous and intermittent (24 hours old)   Type of Nipple: Everted (After stimulation)   Comfort (Breast/Nipple): Soft/non-tender   Hold (Positioning): Partial assist, teach one side, mother does other, staff holds   [facility] Score: 9          Feeding recommendations:  breast feed on demand     Met with mother  Provided mother with Ready, Set, Baby booklet  Discussed Skin to Skin contact an benefits to mom and baby    Talked about the delay of the first bath until baby has adjusted  Spoke about the benefits of rooming in  Feeding on cue and what that means for recognizing infant's hunger  Avoidance of pacifiers for the first month discussed  Talked about exclusive breastfeeding for the first 6 months  Positioning and latch reviewed as well as showing images of other feeding positions  Discussed the properties of a good latch in any position  Reviewed hand/manual expression  Discussed s/s that baby is getting enough milk and some s/s that breastfeeding dyad may need further help  Gave information on common concerns, what to expect the first few weeks after delivery, preparing for other caregivers, and how partners can help  Resources for support also provided  Information on hand expression given  Discussed benefits of knowing how to manually express breast including stimulating milk supply, softening nipple for latch and evacuating breast in the event of engorgement  Discussed 2nd night syndrome and ways to calm infant  Hand out given  Provided DC booklet at this time, enc family to review and prepare questions for day of DC  Assisted parents to place baby skin to skin in laid back hold  Discussed importance of alignment of baby's ear, shoulder, and hip in any preferred position  Worked on supporting baby at breast level and beginning the feed with baby's nose arriving at the nipple  Then, using areolar compression while guiding baby chin-forward to the breast to achieve a deep, comfortable latch  Baby boy is showing hunger cues, blood sugar is WNL  He gapes and latches well with a rocker suck and swallows noted  Mom reports tugging, but no pain  He colostrum expressed initially, and discussed with Mom how this is an important skill       Reviewed signs of effective breastfeeding: audible swallows, strong but comfortable tugging while latched, breasts softening (after milk comes in), baby falling asleep and releasing the breast, and meeting daily diaper goals      Consult placed for SS2   Terri Green RN 1/11/2023 4:55 PM

## 2023-01-11 NOTE — H&P
H&P Exam - Obstetrics   Walker Powers 40 y o  female MRN: [id number]  Unit/Bed#: L&D 329-01 Encounter: [id number]      ASSESSMENT:  39 yo  at 36w6d weeks gestation who is being admitted for repeat  section secondary to history of mid transverse   EFW: 2507 grams - 5 lbs 8 oz  (76%)      PLAN:    Pregnancy at [address] [place] [address] for repeat   Pt already had CBC, T&S and RPR as outpatient preoperatively--preop hgb 12 3  Method of contraception: none desired  GBS negative status  Plan spinal anesthesia    Discussed with Dr Kapil Sosa    This patient will be an INPATIENT  and I certify the anticipated length of stay is >2 Midnights  History of Present Illness     Chief Complaint: Elective  Section, Repeat    HPI:  Walker Powers is a 40 y o   female with an BRENDA of 2023, by Last Menstrual Period at 36w6d weeks gestation who is being admitted for Repeat   Pertinent history includes prior pregnancy that was complicated by multiple anomalies and required mid transverse uterine  at 25 weeks  She  subsequently passed  This pregnancy is also complicated by advanced maternal age, NIPS with increased risked of XYY  Contractions: none  Loss of fluid: none  Vaginal bleeding: none  Fetal movement: yes    She is a Dr Kapil Sosa patient  PREGNANCY COMPLICATIONS:   1) AMA  2) Increased NIPS risk of XYY  3) h o mid transverse uterine  section    OB History    Para Term  AB Living   2 1   1   0   SAB IAB Ectopic Multiple Live Births           1      # Outcome Date GA Lbr Santy/2nd Weight Sex Delivery Anes PTL Lv   2 Current            1  /20/21    M CS-Unspec  N ND      Obstetric Comments   Menarche: 13-      Menses: 25-28/5-6/regular pad every 5 hours       Baby complications/comments: None    Review of Systems   Constitutional: Negative for chills, fatigue, fever and unexpected weight change     HENT: Negative for congestion, mouth sores and sore throat  Respiratory: Negative for cough, chest tightness, shortness of breath and wheezing  Cardiovascular: Negative for chest pain and palpitations  Gastrointestinal: Negative for abdominal distention, abdominal pain, constipation, diarrhea, nausea and vomiting  Endocrine: Negative for cold intolerance and heat intolerance  Genitourinary: Negative for dyspareunia, dysuria, genital sores, menstrual problem, pelvic pain, vaginal bleeding, vaginal discharge and vaginal pain  Musculoskeletal: Negative for arthralgias  Skin: Negative for color change and rash  Neurological: Negative for dizziness, light-headedness and headaches  Hematological: Negative for adenopathy  Historical Information   Past Medical History:   Diagnosis Date   • Canker sores oral    • Chickenpox    • Pap smear for cervical cancer screening     -wnl per pt; 2022-wnl, HRHPV neg   • Seasonal allergies      Past Surgical History:   Procedure Laterality Date   •  SECTION  2021     shortly after birth--elective C/S at 22 weks due to multiple medical problems with fetus     • WISDOM TOOTH EXTRACTION           Social History   Social History     Substance and Sexual Activity   Alcohol Use Not Currently    Comment: none witih pregnancy, rarely prior     Social History     Substance and Sexual Activity   Drug Use Never     Social History     Tobacco Use   Smoking Status Never   Smokeless Tobacco Former   Tobacco Comments    Hookah prior to pregnancy     Family History: non-contributory    Meds/Allergies      Facility-Administered Medications Prior to Admission   Medication   • [COMPLETED] betamethasone acetate-betamethasone sodium phosphate (CELESTONE) injection 12 mg     Medications Prior to Admission   Medication   • ASPIRIN LOW DOSE PO   • Calcium-Magnesium-Vitamin D - MG-MG-UNIT TB24   • Ferrous Sulfate (IRON PO)   • MAGNESIUM ASPARTATE PO      No Known Allergies    OBJECTIVE:    Vitals: Last menstrual period 04/28/2022  There is no height or weight on file to calculate BMI  Physical Exam  Vitals and nursing note reviewed  Constitutional:       General: She is not in acute distress  Appearance: Normal appearance  She is not ill-appearing  HENT:      Head: Normocephalic and atraumatic  Eyes:      Extraocular Movements: Extraocular movements intact  Conjunctiva/sclera: Conjunctivae normal    Cardiovascular:      Rate and Rhythm: Normal rate and regular rhythm  Heart sounds: Normal heart sounds  Pulmonary:      Effort: Pulmonary effort is normal       Breath sounds: Normal breath sounds  Abdominal:      General: Bowel sounds are normal       Palpations: Abdomen is soft  Comments: Gravid, non tender   Musculoskeletal:         General: Normal range of motion  Cervical back: Normal range of motion  Skin:     General: Skin is warm  Findings: No erythema or rash  Neurological:      Mental Status: She is alert and oriented to person, place, and time  Psychiatric:         Mood and Affect: Mood normal          Behavior: Behavior normal          Thought Content: Thought content normal          Judgment: Judgment normal          Cervix: deferred     Fetal heart rate: 140 FHR, moderate variability, accelerations, no decelerations      [place]: no contractions      GBS: negative    Prenatal Labs: I have personally reviewed pertinent reports    , Blood Type:   Lab Results   Component Value Date/Time    ABO Grouping A 01/08/2023 09:46 AM     , D (Rh type):   Lab Results   Component Value Date/Time    Rh Factor Positive 01/08/2023 09:46 AM     , Antibody Screen:A positive , HCT/HGB:   Lab Results   Component Value Date/Time    Hematocrit 36 5 01/08/2023 09:46 AM    Hemoglobin 12 3 01/08/2023 09:46 AM      , MCV:   Lab Results   Component Value Date/Time    MCV 94 01/08/2023 09:46 AM      , Platelets:   Lab Results   Component Value Date/Time    Platelets 269 29/78/7540 09:46 AM      , Varicella: not tested    , Rubella: immune on 8/7/22, VDRL/RPR:   Lab Results   Component Value Date/Time    RPR Non-Reactive 01/08/2023 09:46 AM      , Urine Culture/Screen:   Lab Results   Component Value Date/Time    Urine Culture 40,000-49,000 cfu/ml 11/18/2022 09:50 AM       , Urine Drug Screen: not ordered Hep B:   Lab Results   Component Value Date/Time    Hepatitis B Surface Ag negative 08/07/2022 12:00 AM     , Hep C: No components found for: HEPCSAG, EXTHEPCSAG   , HIV:   Lab Results   Component Value Date/Time    HIV-1/HIV-2 AB Non-Reactive 08/07/2022 12:00 AM     , Chlamydia:   Lab Results   Component Value Date/Time    External Chlamydia Screen negative 11/30/2022 12:00 AM         Invasive Devices     None                 Blaze Nelson MD  1/11/2023  9:30 AM     I agree with the H&P as written by Dr Moe Cintron and edited by me   I personally saw and assess the patient separately      Linden Carty MD

## 2023-01-11 NOTE — PLAN OF CARE
Problem: PAIN - ADULT  Goal: Verbalizes/displays adequate comfort level or baseline comfort level  Description: Interventions:  - Encourage patient to monitor pain and request assistance  - Assess pain using appropriate pain scale  - Administer analgesics based on type and severity of pain and evaluate response  - Implement non-pharmacological measures as appropriate and evaluate response  - Consider cultural and social influences on pain and pain management  - Notify physician/advanced practitioner if interventions unsuccessful or patient reports new pain  Outcome: Progressing     Problem: INFECTION - ADULT  Goal: Absence or prevention of progression during hospitalization  Description: INTERVENTIONS:  - Assess and monitor for signs and symptoms of infection  - Monitor lab/diagnostic results  - Monitor all insertion sites, i e  indwelling lines, tubes, and drains  - Monitor endotracheal if appropriate and nasal secretions for changes in amount and color  - [place] appropriate cooling/warming therapies per order  - Administer medications as ordered  - Instruct and encourage patient and family to use good hand hygiene technique  - Identify and instruct in appropriate isolation precautions for identified infection/condition  Outcome: Progressing  Goal: Absence of fever/infection during neutropenic period  Description: INTERVENTIONS:  - Monitor WBC    Outcome: Progressing     Problem: SAFETY ADULT  Goal: Patient will remain free of falls  Description: INTERVENTIONS:  - Educate patient/family on patient safety including physical limitations  - Instruct patient to call for assistance with activity   - Consult OT/PT to assist with strengthening/mobility   - Keep Call bell within reach  - Keep bed low and locked with side rails adjusted as appropriate  - Keep care items and personal belongings within reach  - Initiate and maintain comfort rounds  - Make Fall Risk Sign visible to staff  - Offer Toileting every  Hours, in advance of need  - Initiate/Maintain alarm  - Obtain necessary fall risk management equipment:   - Apply yellow socks and bracelet for high fall risk patients  - Consider moving patient to room near nurses station  Outcome: Progressing  Goal: Maintain or return to baseline ADL function  Description: INTERVENTIONS:  -  Assess patient's ability to carry out ADLs; assess patient's baseline for ADL function and identify physical deficits which impact ability to perform ADLs (bathing, care of mouth/teeth, toileting, grooming, dressing, etc )  - Assess/evaluate cause of self-care deficits   - Assess range of motion  - Assess patient's mobility; develop plan if impaired  - Assess patient's need for assistive devices and provide as appropriate  - Encourage maximum independence but intervene and supervise when necessary  - Involve family in performance of ADLs  - Assess for home care needs following discharge   - Consider OT consult to assist with ADL evaluation and planning for discharge  - Provide patient education as appropriate  Outcome: Progressing  Goal: Maintains/Returns to pre admission functional level  Description: INTERVENTIONS:  - Perform BMAT or MOVE assessment daily    - Set and communicate daily mobility goal to care team and patient/family/caregiver  - Collaborate with rehabilitation services on mobility goals if consulted  - Perform Range of Motion  times a day  - Reposition patient every  hours    - Dangle patient  times a day  - Stand patient  times a day  - Ambulate patient  times a day  - Out of bed to chair  times a day   - Out of bed for meals  times a day  - Out of bed for toileting  - Record patient progress and toleration of activity level   Outcome: Progressing     Problem: DISCHARGE PLANNING  Goal: Discharge to home or other facility with appropriate resources  Description: INTERVENTIONS:  - Identify barriers to discharge w/patient and caregiver  - Arrange for needed discharge resources and transportation as appropriate  - Identify discharge learning needs (meds, wound care, etc )  - Arrange for interpretive services to assist at discharge as needed  - Refer to Case Management Department for coordinating discharge planning if the patient needs post-hospital services based on physician/advanced practitioner order or complex needs related to functional status, cognitive ability, or social support system  Outcome: Progressing     Problem: Knowledge Deficit  Goal: Patient/family/caregiver demonstrates understanding of disease process, treatment plan, medications, and discharge instructions  Description: Complete learning assessment and assess knowledge base    Interventions:  - Provide teaching at level of understanding  - Provide teaching via preferred learning methods  Outcome: Progressing

## 2023-01-12 LAB
BASOPHILS # BLD MANUAL: 0.2 THOUSAND/UL (ref 0–0.1)
BASOPHILS NFR MAR MANUAL: 1 % (ref 0–1)
EOSINOPHIL # BLD MANUAL: 0 THOUSAND/UL (ref 0–0.4)
EOSINOPHIL NFR BLD MANUAL: 0 % (ref 0–6)
ERYTHROCYTE [DISTWIDTH] IN BLOOD BY AUTOMATED COUNT: 13.9 % (ref 11.6–15.1)
HCT VFR BLD AUTO: 31 % (ref 34.8–46.1)
HGB BLD-MCNC: 10.5 G/DL (ref 11.5–15.4)
LYMPHOCYTES # BLD AUTO: 12 % (ref 14–44)
LYMPHOCYTES # BLD AUTO: 2.38 THOUSAND/UL (ref 0.6–4.47)
MCH RBC QN AUTO: 31.3 PG (ref 26.8–34.3)
MCHC RBC AUTO-ENTMCNC: 33.9 G/DL (ref 31.4–37.4)
MCV RBC AUTO: 92 FL (ref 82–98)
MONOCYTES # BLD AUTO: 1.39 THOUSAND/UL (ref 0–1.22)
MONOCYTES NFR BLD: 7 % (ref 4–12)
NEUTROPHILS # BLD MANUAL: 15.87 THOUSAND/UL (ref 1.85–7.62)
NEUTS SEG NFR BLD AUTO: 80 % (ref 43–75)
PLATELET # BLD AUTO: 141 THOUSANDS/UL (ref 149–390)
PLATELET BLD QL SMEAR: ABNORMAL
PMV BLD AUTO: 10.8 FL (ref 8.9–12.7)
RBC # BLD AUTO: 3.36 MILLION/UL (ref 3.81–5.12)
RBC MORPH BLD: PRESENT
WBC # BLD AUTO: 19.84 THOUSAND/UL (ref 4.31–10.16)

## 2023-01-12 RX ORDER — IBUPROFEN 600 MG/1
600 TABLET ORAL EVERY 6 HOURS PRN
Status: DISCONTINUED | OUTPATIENT
Start: 2023-01-12 | End: 2023-01-13 | Stop reason: HOSPADM

## 2023-01-12 RX ORDER — KETOROLAC TROMETHAMINE 30 MG/ML
15 INJECTION, SOLUTION INTRAMUSCULAR; INTRAVENOUS EVERY 6 HOURS PRN
Status: DISPENSED | OUTPATIENT
Start: 2023-01-12 | End: 2023-01-13

## 2023-01-12 RX ORDER — ENOXAPARIN SODIUM 100 MG/ML
40 INJECTION SUBCUTANEOUS
Status: DISCONTINUED | OUTPATIENT
Start: 2023-01-12 | End: 2023-01-13 | Stop reason: HOSPADM

## 2023-01-12 RX ADMIN — IBUPROFEN 600 MG: 600 TABLET, FILM COATED ORAL at 23:49

## 2023-01-12 RX ADMIN — ACETAMINOPHEN 650 MG: 325 TABLET ORAL at 21:31

## 2023-01-12 RX ADMIN — DOCUSATE SODIUM 100 MG: 100 CAPSULE ORAL at 18:19

## 2023-01-12 RX ADMIN — ACETAMINOPHEN 650 MG: 325 TABLET ORAL at 08:21

## 2023-01-12 RX ADMIN — ACETAMINOPHEN 650 MG: 325 TABLET ORAL at 14:14

## 2023-01-12 RX ADMIN — KETOROLAC TROMETHAMINE 15 MG: 30 INJECTION, SOLUTION INTRAMUSCULAR; INTRAVENOUS at 05:36

## 2023-01-12 RX ADMIN — IBUPROFEN 600 MG: 600 TABLET, FILM COATED ORAL at 18:19

## 2023-01-12 RX ADMIN — DOCUSATE SODIUM 100 MG: 100 CAPSULE ORAL at 08:21

## 2023-01-12 RX ADMIN — ENOXAPARIN SODIUM 40 MG: 40 INJECTION SUBCUTANEOUS at 08:21

## 2023-01-12 RX ADMIN — ACETAMINOPHEN 650 MG: 325 TABLET ORAL at 02:50

## 2023-01-12 NOTE — PLAN OF CARE
Problem: PAIN - ADULT  Goal: Verbalizes/displays adequate comfort level or baseline comfort level  Description: Interventions:  - Encourage patient to monitor pain and request assistance  - Assess pain using appropriate pain scale  - Administer analgesics based on type and severity of pain and evaluate response  - Implement non-pharmacological measures as appropriate and evaluate response  - Consider cultural and social influences on pain and pain management  - Notify physician/advanced practitioner if interventions unsuccessful or patient reports new pain  Outcome: Progressing     Problem: INFECTION - ADULT  Goal: Absence or prevention of progression during hospitalization  Description: INTERVENTIONS:  - Assess and monitor for signs and symptoms of infection  - Monitor lab/diagnostic results  - Monitor all insertion sites, i e  indwelling lines, tubes, and drains  - Monitor endotracheal if appropriate and nasal secretions for changes in amount and color  - [place] appropriate cooling/warming therapies per order  - Administer medications as ordered  - Instruct and encourage patient and family to use good hand hygiene technique  - Identify and instruct in appropriate isolation precautions for identified infection/condition  Outcome: Progressing  Goal: Absence of fever/infection during neutropenic period  Description: INTERVENTIONS:  - Monitor WBC    Outcome: Progressing     Problem: SAFETY ADULT  Goal: Patient will remain free of falls  Description: INTERVENTIONS:  - Educate patient/family on patient safety including physical limitations  - Instruct patient to call for assistance with activity   - Consult OT/PT to assist with strengthening/mobility   - Keep Call bell within reach  - Keep bed low and locked with side rails adjusted as appropriate  - Keep care items and personal belongings within reach  - Initiate and maintain comfort rounds  - Make Fall Risk Sign visible to staff  - Apply yellow socks and bracelet for high fall risk patients  - Consider moving patient to room near nurses station  Outcome: Progressing  Goal: Maintain or return to baseline ADL function  Description: INTERVENTIONS:  -  Assess patient's ability to carry out ADLs; assess patient's baseline for ADL function and identify physical deficits which impact ability to perform ADLs (bathing, care of mouth/teeth, toileting, grooming, dressing, etc )  - Assess/evaluate cause of self-care deficits   - Assess range of motion  - Assess patient's mobility; develop plan if impaired  - Assess patient's need for assistive devices and provide as appropriate  - Encourage maximum independence but intervene and supervise when necessary  - Involve family in performance of ADLs  - Assess for home care needs following discharge   - Consider OT consult to assist with ADL evaluation and planning for discharge  - Provide patient education as appropriate  Outcome: Progressing  Goal: Maintains/Returns to pre admission functional level  Description: INTERVENTIONS:  - Perform BMAT or MOVE assessment daily    - Set and communicate daily mobility goal to care team and patient/family/caregiver     - Collaborate with rehabilitation services on mobility goals if consulted  - Out of bed for toileting  - Record patient progress and toleration of activity level   Outcome: Progressing     Problem: DISCHARGE PLANNING  Goal: Discharge to home or other facility with appropriate resources  Description: INTERVENTIONS:  - Identify barriers to discharge w/patient and caregiver  - Arrange for needed discharge resources and transportation as appropriate  - Identify discharge learning needs (meds, wound care, etc )  - Arrange for interpretive services to assist at discharge as needed  - Refer to Case Management Department for coordinating discharge planning if the patient needs post-hospital services based on physician/advanced practitioner order or complex needs related to functional status, cognitive ability, or social support system  Outcome: Progressing     Problem: Knowledge Deficit  Goal: Patient/family/caregiver demonstrates understanding of disease process, treatment plan, medications, and discharge instructions  Description: Complete learning assessment and assess knowledge base    Interventions:  - Provide teaching at level of understanding  - Provide teaching via preferred learning methods  Outcome: Progressing

## 2023-01-12 NOTE — CASE MANAGEMENT
Case Management Progress Note    Patient name Dayan Hudson  Location L&D 305/L&D 305- MRN [id number]  : 1985 Date 2023       LOS (days): 1  Geometric Mean LOS (GMLOS) (days):   Days to GMLOS:        OBJECTIVE:        Current admission status: Inpatient  Preferred Pharmacy:   [facility] - [address]  Phone: [phone] Fax: [phone]    Primary Care Provider: No primary care provider on file  [place]  Secondary Insurance:     PROGRESS NOTE:    Per insurance verifiers: "[facility] does not indicate that she has Luci Solares 23 [place] as of yet-she probably just picked her plan- most likely will become effective later this month or 23- I tried [employer] website as well and she is not covered by them as yet"    CM informed MOB of same and instructed her to call [employer] (info on AVS) once her new insurance information comes in  MOB expressed understanding

## 2023-01-12 NOTE — PROGRESS NOTES
Progress Note - OB/GYN  Clois Cola 40 y o  female MRN: [id number]  Unit/Bed#: L&D 305-01 Encounter: [id number]    Assessment and Plan     Clois Cola is a patient of: Dr Alfreda Abebe  She is PPD# 1 s/p RLTCS  Recovering well and is stable       * S/P repeat low transverse   Assessment & Plan  , Hgb 12 3 --> 10 5 post op Hgb   Lines: coats in place ,  UO 2 6cc/kg/hr  Pain: Tylenol and toradol scheduled, sreedhar 5/10 PRN    FEN: Tolerating regular diet  DVT ppx: SCDs and  Lovenox 40mg qD  Passing flatus   Incision C/D/I         Disposition    - Anticipate discharge home on POD# 3-4      Subjective/Objective     Chief Complaint: Postpartum State     Subjective:    Clois Cola is POD#1 s/p RLTCS  She has no current complaints  Pain is well controlled  She reports that overnight she had an episode of numbness of her face and hands when she was flat on the bed but that it resolved when she sat up  Patient is not currently voiding and still has candelaria coats in place  She is not ambulating  Patient is currently passing flatus and has had no bowel movement  She is tolerating PO, and denies nausea or vomitting  Patient denies fever, chills, chest pain, shortness of breath, or calf tenderness  Lochia is normal  She is  Breastfeeding  She is recovering well and is stable         Vitals:   /54 (BP Location: Right arm)   Pulse 72   Temp 98 °F (36 7 °C) (Oral)   Resp 18   Ht 5' 6" (1 676 m)   Wt 88 5 kg (195 lb)   LMP 2022 (Exact Date)   SpO2 97%   Breastfeeding Yes   BMI 31 47 kg/m²       Intake/Output Summary (Last 24 hours) at 2023 0649  Last data filed at 2023 0000  Gross per 24 hour   Intake 2000 ml   Output 4303 ml   Net -2303 ml       Invasive Devices     Peripheral Intravenous Line  Duration           Peripheral IV 23 Proximal;Right;Ventral (anterior) Forearm <1 day          Drain  Duration           Urethral Catheter Double-lumen;Non-latex 16 Fr  <1 day Physical Exam:   GEN: Angelique Polk appears well, alert and oriented x 3, pleasant and cooperative   CARDIO: RRR, no murmurs or rubs  RESP:  CTAB, no wheezes or rales  ABDOMEN: soft, no tenderness, no distention, fundus @ U, Incision C/D/I  EXTREMITIES: SCDs on, non tender, no erythema      Labs:     Hemoglobin   Date Value Ref Range Status   01/12/2023 10 5 (L) 11 5 - 15 4 g/dL Preliminary   01/08/2023 12 3 11 5 - 15 4 g/dL Final     WBC   Date Value Ref Range Status   01/12/2023 19 84 (H) 4 31 - 10 16 Thousand/uL Preliminary   01/08/2023 14 08 (H) 4 31 - 10 16 Thousand/uL Final     Platelets   Date Value Ref Range Status   01/12/2023 141 (L) 149 - 390 Thousands/uL Preliminary   01/08/2023 158 149 - 390 Thousands/uL Final          Sobia Wade MD  1/12/2023  6:49 AM

## 2023-01-12 NOTE — ASSESSMENT & PLAN NOTE
, Hgb 12 3 --> 10 5 post op Hgb   Lines: coats removed, passed void trial  Pain: Tylenol and toradol scheduled, sreedhar 5/10 PRN    FEN: Tolerating regular diet  DVT ppx: SCDs and  Lovenox 40mg qD  Passing flatus   Incision C/D/I

## 2023-01-12 NOTE — OP NOTE
OPERATIVE REPORT  PATIENT NAME: Hilda Queen    :  1985  MRN: [id number]  Pt Location: [facility] L&[facility] OR ROOM 01    SURGERY DATE: 2023    Surgeon(s) and Role:     * Cecily Magana MD - Primary  No qualified residents available    Preop Diagnosis:  1  36 weeks 6 days gestation  2  History of  section at 25 weeks gestation  3  Advanced maternal age  3  Increased risk on NIPS for XYY  5  S/P BMZ x 2 for FLM  6  GBS negative by culture    Postoperative Diagnosis:  Same   S/p repeat mid transverse  section    Procedure(s) (LRB):   SECTION () REPEAT (N/A)    Specimen(s):  ID Type Source Tests Collected by Time Destination   A :  Cord Blood Cord BLOOD GAS, VENOUS, CORD, BLOOD GAS, ARTERIAL, CORD Cecily Magana MD 2023 1014    B :  Tissue (Placenta on Hold) OB Only Placenta PLACENTA IN STORAGE Cecily Magana MD 2023 1014        Surgical QBL:  Surgical QBL (mL): 953 mL      Drains:  Urethral Catheter Double-lumen;Non-latex 16 Fr  (Active)   Reasons to continue Urinary Catheter  Post-operative urological requirements 23 1400   Goal for Removal Remove POD#1 23 1400   Site Assessment Clean;Skin intact 23 1400   Choi Care Done 23 1400   Collection Container Standard drainage bag 23 1400   Number of days: 0       Anesthesia Type:   Spinal Anesthesia--Dr Drake    Operative Indications: H O C/S at 25 weeks gestation     [place] Classification System:  No Multiple pregnancy, No Transverse or oblique lie, No Breech lie, Gestational age is < 37 weeks +  is DANI GROUP 10    Operative Findings: Moderate facial adhesions  Dense adhesions in lower uterine segment to bladder  Viable male  on 2023 at 1128 with APGARs of 8 and 9 at 1 and 5 minutes  Fetus weighted 5 lb 15 6oz   2  Clear amniotic fluid  3  Normal intact placenta with centrally inserted 3VC    4  Normal uterus, bilateral tubes and ovaries    Complications:   None apparent    Procedure and Technique:    Brief History  Mario Lopez is a 40 y o  [id number] at 36w6d admitted for repeat  section secondary to history of  section at 25 weeks  Umbilical Cord Venous Blood Gas:  Results from last 7 days   Lab Units 23  1014   PH COV  7 391   PCO2 COV mm HG 41 2   HCO3 COV mmol/L 24 4   BASE EXC COV mmol/L -0 5*   O2 CT CD VB mL/dL 13 3   O2 HGB, VENOUS CORD % 26 3     Umbilical Cord Arterial Blood Gas:  Results from last 7 days   Lab Units 23  1014   PH COA  7 359   PCO2 COA  46 4   PO2 COA mm HG 21 3   HCO3 COA mmol/L 25 6   BASE EXC COA mmol/L -0 3*   O2 CONTENT CORD ART ml/dl 9 4   O2 HGB, ARTERIAL CORD % 48 0       Operative Technique  The patient was taken to the operating room  Spinal anesthesia was adequately established and 2g Ancef was given for preoperative prophylaxis  The patient was then placed in the dorsal supine position with a left tilt of the hips  The patient was then prepped with betadine for vaginal prep and chloraprep for abdominal prep and draped in the usual sterile fashion for a Pfannenstiel skin incision  A time out was performed to confirm correct patient and correct procedure  An Pfannenstiel incision was made in the skin with a surgical scalpel and sharp dissection was carried out over subsequent layers of tissue including the fascia, followed by the Bovie electrocautery for hemostasis  The fascia was incised at the midline and the fascial incision was extended bilaterally using the curved [place] scissors  Moderate adhesions from the fascia to the muscle were noted  The superior edge of the fascial incision was grasped with Kocher clamps, tented up and the underlying rectus muscles were dissected off bluntly and sharply using the [place]  The same was done inferiorly  The rectus muscles were adhesed in the midline   They were tented up with Allis clamps and sharp dissection was performed with a Metzenbaum scissors to divide them at midline  Sharp dissection was performed until the peritoneum was identified, tented up at its upper margin taking care to avoid the bladder, and then entered  The peritoneal incision was extended superiorly and inferiorly  The bladder blade was inserted and a transverse incision was made in the mid to low uterine segment using a new surgical blade  Care was taken to avoid the dense adhesions near the bladder that extended into the lower uterine segment  The uterine incision was extended cephalad using blunt dissection  The amniotic sac was entered  The surgeon's hand was placed into the uterine cavity  The fetal head was identified and elevated through the uterine incision with the assistance of fundal pressure  With gentle traction, the shoulder was delivered, followed by the rest of the fetal body  There was no nuchal cord noted  On delivery the cord was doubly clamped and cut after delayed cord clamping  The infant was then passed off the table to the awaiting  staff  The  was noted to cry spontaneously and moved all extremities  Venous and arterial blood gas, cord blood, and portion of cord was obtained for analysis and routine blood testing  The placenta delivery was then sent to storage  Placenta was noted to be intact with a centrally inserted three-vessel cord  Oxytocin was administered by IV infusion to enhance uterine contraction  The uterus was exteriorized and cleared of all clots and remaining products of conception  The uterine incision was re approximated using an 0 Vicryl in a running locked fashion  A second horizontal imbricating stitch with the same suture was applied  The uterine incision was examined and noted to have several areas with bleeding  1g of TXA was administered  Several figure of 8 sutures were placed with 0-Vicryl until the area was noted to be hemostatic   The posterior cul-de-sac was cleared of all clots and products of conception  The uterus was replaced into the abdomen and the pericolic gutters were cleared of all clots  The uterine incision was once again reexamined and noted to be hemostatic  The fascia was re approximated using an 0 Vicryl in a running nonlocked fashion  The subcutaneous tissue was irrigated and cleared of all clots and debris  Good hemostasis was noted with Bovie electrocautery  The subcutaneous tissue was reapproximated with 3-0 plain suture  The skin incision was closed using 4-0 Monocryl  Exofin was subsequently used as well  Good hemostasis was noted  Patient tolerated the procedure well  All needle, sponge, and instrument counts were noted to be correct x 2 at the end of the procedure  Patient was transferred to the recovery room in stable condition         SIGNATURE: Adore Addison MD  DATE: January 11, 2023  TIME: 8:38 PM

## 2023-01-12 NOTE — CASE MANAGEMENT
Case Management Progress Note    Patient name Josh Canales  Location L&D 305/L&D 305-01 MRN [id number]  : 1985 Date 2023       LOS (days): 1  Geometric Mean LOS (GMLOS) (days):   Days to GMLOS:        OBJECTIVE:        Current admission status: Inpatient  Preferred Pharmacy:   [facility] - [address]  Phone: [phone] Fax: [phone]    Primary Care Provider: No primary care provider on file  [place]  Secondary Insurance:     PROGRESS NOTE:    MOB requested [employer]ra S2 pump - CM placed order to [employer] via [employer]  Per [employer], pump process will take up to 45 days as MOB has straight [place] Medicaid  CM informed MOB of same  MOB reported that she now has [facility], but was told she will not get the new card/new member ID until   CM reached out to inpatient insurance verifiers to inquire if they are able to confirm this  Per auth/cert notes, MOB's [place] was already verified as active in [facility]  CM placed instructions on AVS for contacting [employer] after discharge (once MOB obtains new Medicaid ID number) in case insurance verifiers are unable to verify [employer] coverage

## 2023-01-12 NOTE — LACTATION NOTE
This note was copied from a baby's chart  Mother verbalized breastfeeding is going well  Denies Nipple Pain, need for assistance OR Supplies  Family support at bedside  Enc to call for assistance as needed,phone # given

## 2023-01-12 NOTE — PLAN OF CARE
Problem: PAIN - ADULT  Goal: Verbalizes/displays adequate comfort level or baseline comfort level  Description: Interventions:  - Encourage patient to monitor pain and request assistance  - Assess pain using appropriate pain scale  - Administer analgesics based on type and severity of pain and evaluate response  - Implement non-pharmacological measures as appropriate and evaluate response  - Consider cultural and social influences on pain and pain management  - Notify physician/advanced practitioner if interventions unsuccessful or patient reports new pain  Outcome: Progressing     Problem: INFECTION - ADULT  Goal: Absence or prevention of progression during hospitalization  Description: INTERVENTIONS:  - Assess and monitor for signs and symptoms of infection  - Monitor lab/diagnostic results  - Monitor all insertion sites, i e  indwelling lines, tubes, and drains  - Monitor endotracheal if appropriate and nasal secretions for changes in amount and color  - [place] appropriate cooling/warming therapies per order  - Administer medications as ordered  - Instruct and encourage patient and family to use good hand hygiene technique  - Identify and instruct in appropriate isolation precautions for identified infection/condition  Outcome: Progressing  Goal: Absence of fever/infection during neutropenic period  Description: INTERVENTIONS:  - Monitor WBC    Outcome: Progressing     Problem: SAFETY ADULT  Goal: Patient will remain free of falls  Description: INTERVENTIONS:  - Educate patient/family on patient safety including physical limitations  - Instruct patient to call for assistance with activity   - Consult OT/PT to assist with strengthening/mobility   - Keep Call bell within reach  - Keep bed low and locked with side rails adjusted as appropriate  - Keep care items and personal belongings within reach  - Initiate and maintain comfort rounds  - Apply yellow socks and bracelet for high fall risk patients  - Consider moving patient to room near nurses station  Outcome: Progressing  Goal: Maintain or return to baseline ADL function  Description: INTERVENTIONS:  -  Assess patient's ability to carry out ADLs; assess patient's baseline for ADL function and identify physical deficits which impact ability to perform ADLs (bathing, care of mouth/teeth, toileting, grooming, dressing, etc )  - Assess/evaluate cause of self-care deficits   - Assess range of motion  - Assess patient's mobility; develop plan if impaired  - Assess patient's need for assistive devices and provide as appropriate  - Encourage maximum independence but intervene and supervise when necessary  - Involve family in performance of ADLs  - Assess for home care needs following discharge   - Consider OT consult to assist with ADL evaluation and planning for discharge  - Provide patient education as appropriate  Outcome: Progressing  Goal: Maintains/Returns to pre admission functional level  Description: INTERVENTIONS:  - Perform BMAT or MOVE assessment daily    - Set and communicate daily mobility goal to care team and patient/family/caregiver     - Collaborate with rehabilitation services on mobility goals if consulted  - Out of bed for toileting  - Record patient progress and toleration of activity level   Outcome: Progressing

## 2023-01-13 VITALS
HEIGHT: 66 IN | BODY MASS INDEX: 31.34 KG/M2 | DIASTOLIC BLOOD PRESSURE: 57 MMHG | OXYGEN SATURATION: 97 % | TEMPERATURE: 97.7 F | RESPIRATION RATE: 16 BRPM | WEIGHT: 195 LBS | HEART RATE: 94 BPM | SYSTOLIC BLOOD PRESSURE: 103 MMHG

## 2023-01-13 RX ORDER — IBUPROFEN 600 MG/1
600 TABLET ORAL EVERY 6 HOURS PRN
Qty: 30 TABLET | Refills: 0 | Status: SHIPPED | OUTPATIENT
Start: 2023-01-13

## 2023-01-13 RX ORDER — OXYCODONE HYDROCHLORIDE 5 MG/1
5 TABLET ORAL EVERY 4 HOURS PRN
Qty: 4 TABLET | Refills: 0 | Status: CANCELLED | OUTPATIENT
Start: 2023-01-13 | End: 2023-01-23

## 2023-01-13 RX ORDER — CALCIUM CARBONATE 200(500)MG
1000 TABLET,CHEWABLE ORAL DAILY PRN
Refills: 0
Start: 2023-01-13

## 2023-01-13 RX ORDER — ACETAMINOPHEN 325 MG/1
650 TABLET ORAL EVERY 6 HOURS SCHEDULED
Qty: 10 TABLET | Refills: 0
Start: 2023-01-13 | End: 2023-01-15

## 2023-01-13 RX ORDER — DOCUSATE SODIUM 100 MG/1
100 CAPSULE, LIQUID FILLED ORAL 2 TIMES DAILY
Refills: 0
Start: 2023-01-13

## 2023-01-13 RX ADMIN — ENOXAPARIN SODIUM 40 MG: 40 INJECTION SUBCUTANEOUS at 09:17

## 2023-01-13 RX ADMIN — ACETAMINOPHEN 650 MG: 325 TABLET ORAL at 03:28

## 2023-01-13 RX ADMIN — ACETAMINOPHEN 650 MG: 325 TABLET ORAL at 13:17

## 2023-01-13 RX ADMIN — IBUPROFEN 600 MG: 600 TABLET, FILM COATED ORAL at 13:17

## 2023-01-13 RX ADMIN — ACETAMINOPHEN 650 MG: 325 TABLET ORAL at 09:10

## 2023-01-13 RX ADMIN — DOCUSATE SODIUM 100 MG: 100 CAPSULE ORAL at 09:10

## 2023-01-13 NOTE — PROGRESS NOTES
Progress Note - OB/GYN  Susanne Nino 40 y o  female MRN: [id number]  Unit/Bed#: L&D 305- Encounter: [id number]    Assessment and Plan     Susanne Nino is a patient of: Dr Umang Adair  She is PPD# 2 s/p RLTCS  Recovering well and is stable       * S/P repeat low transverse   Assessment & Plan  , Hgb 12 3 --> 10 5 post op Hgb   Lines: coats in place ,  UO 2 6cc/kg/hr  Pain: Tylenol and toradol scheduled, sreedhar 5/10 PRN    FEN: Tolerating regular diet  DVT ppx: SCDs and  Lovenox 40mg qD  Passing flatus   Incision C/D/I         Disposition    - Anticipate discharge home on POD#2       Subjective/Objective     Chief Complaint: Postpartum State     Subjective:    Susanne Nino is POD#2 s/p RLTCS  She has no current complaints  Pain is well controlled  She reports that overnight she had an episode of numbness of her face and hands when she was flat on the bed but that it resolved when she sat up  Patient is currently voiding  She is ambulating  Patient is currently passing flatus and has had bowel movement  She is tolerating PO, and denies nausea or vomitting  Patient denies fever, chills, chest pain, shortness of breath, or calf tenderness  Lochia is normal  She is  Breastfeeding  She is recovering well and is stable         Vitals:   /55 (BP Location: Right arm)   Pulse 78   Temp 97 7 °F (36 5 °C) (Oral)   Resp 15   Ht 5' 6" (1 676 m)   Wt 88 5 kg (195 lb)   LMP 2022 (Exact Date)   SpO2 96%   Breastfeeding Yes   BMI 31 47 kg/m²       Intake/Output Summary (Last 24 hours) at 2023 0615  Last data filed at 2023 1701  Gross per 24 hour   Intake --   Output 3900 ml   Net -3900 ml       Invasive Devices     None                 Physical Exam:   GEN: Susanne Nino appears well, alert and oriented x 3, pleasant and cooperative   CARDIO: RRR, no murmurs or rubs  RESP:  CTAB, no wheezes or rales  ABDOMEN: soft, no tenderness, no distention, fundus @ U, Incision C/D/I  EXTREMITIES: SCDs on, non tender, no erythema      Labs:     Hemoglobin   Date Value Ref Range Status   01/12/2023 10 5 (L) 11 5 - 15 4 g/dL Final   01/08/2023 12 3 11 5 - 15 4 g/dL Final     WBC   Date Value Ref Range Status   01/12/2023 19 84 (H) 4 31 - 10 16 Thousand/uL Final   01/08/2023 14 08 (H) 4 31 - 10 16 Thousand/uL Final     Platelets   Date Value Ref Range Status   01/12/2023 141 (L) 149 - 390 Thousands/uL Final   01/08/2023 158 149 - 390 Thousands/uL Final          Sobia Wade MD  1/13/2023  6:15 AM

## 2023-01-13 NOTE — PLAN OF CARE
Problem: PAIN - ADULT  Goal: Verbalizes/displays adequate comfort level or baseline comfort level  Description: Interventions:  - Encourage patient to monitor pain and request assistance  - Assess pain using appropriate pain scale  - Administer analgesics based on type and severity of pain and evaluate response  - Implement non-pharmacological measures as appropriate and evaluate response  - Consider cultural and social influences on pain and pain management  - Notify physician/advanced practitioner if interventions unsuccessful or patient reports new pain  1/13/2023 1120 by Fozia Hubbard RN  Outcome: Completed  1/13/2023 0902 by Fozia Hubbard RN  Outcome: Progressing     Problem: INFECTION - ADULT  Goal: Absence or prevention of progression during hospitalization  Description: INTERVENTIONS:  - Assess and monitor for signs and symptoms of infection  - Monitor lab/diagnostic results  - Monitor all insertion sites, i e  indwelling lines, tubes, and drains  - Monitor endotracheal if appropriate and nasal secretions for changes in amount and color  - [place] appropriate cooling/warming therapies per order  - Administer medications as ordered  - Instruct and encourage patient and family to use good hand hygiene technique  - Identify and instruct in appropriate isolation precautions for identified infection/condition  1/13/2023 1120 by Fozia Hubbard RN  Outcome: Completed  1/13/2023 0902 by Fozia Hubbard RN  Outcome: Progressing  Goal: Absence of fever/infection during neutropenic period  Description: INTERVENTIONS:  - Monitor WBC    1/13/2023 1120 by Fozia Hubbard RN  Outcome: Completed  1/13/2023 0902 by Fozia Hubbard RN  Outcome: Progressing     Problem: SAFETY ADULT  Goal: Patient will remain free of falls  Description: INTERVENTIONS:  - Educate patient/family on patient safety including physical limitations  - Instruct patient to call for assistance with activity   - Consult OT/PT to assist with strengthening/mobility   - Keep Call bell within reach  - Keep bed low and locked with side rails adjusted as appropriate  - Keep care items and personal belongings within reach  - Initiate and maintain comfort rounds  - Make Fall Risk Sign visible to staff  - Apply yellow socks and bracelet for high fall risk patients  - Consider moving patient to room near nurses station  1/13/2023 1120 by Yung Ho RN  Outcome: Completed  1/13/2023 0902 by Yung Ho RN  Outcome: Progressing  Goal: Maintain or return to baseline ADL function  Description: INTERVENTIONS:  -  Assess patient's ability to carry out ADLs; assess patient's baseline for ADL function and identify physical deficits which impact ability to perform ADLs (bathing, care of mouth/teeth, toileting, grooming, dressing, etc )  - Assess/evaluate cause of self-care deficits   - Assess range of motion  - Assess patient's mobility; develop plan if impaired  - Assess patient's need for assistive devices and provide as appropriate  - Encourage maximum independence but intervene and supervise when necessary  - Involve family in performance of ADLs  - Assess for home care needs following discharge   - Consider OT consult to assist with ADL evaluation and planning for discharge  - Provide patient education as appropriate  1/13/2023 1120 by Yung Ho RN  Outcome: Completed  1/13/2023 0902 by Yung Ho RN  Outcome: Progressing  Goal: Maintains/Returns to pre admission functional level  Description: INTERVENTIONS:  - Perform BMAT or MOVE assessment daily    - Set and communicate daily mobility goal to care team and patient/family/caregiver     - Collaborate with rehabilitation services on mobility goals if consulted  - Out of bed for toileting  - Record patient progress and toleration of activity level   1/13/2023 1120 by Yung Ho RN  Outcome: Completed  1/13/2023 0902 by Yung Ho RN  Outcome: Progressing     Problem: DISCHARGE PLANNING  Goal: Discharge to home or other facility with appropriate resources  Description: INTERVENTIONS:  - Identify barriers to discharge w/patient and caregiver  - Arrange for needed discharge resources and transportation as appropriate  - Identify discharge learning needs (meds, wound care, etc )  - Arrange for interpretive services to assist at discharge as needed  - Refer to Case Management Department for coordinating discharge planning if the patient needs post-hospital services based on physician/advanced practitioner order or complex needs related to functional status, cognitive ability, or social support system  1/13/2023 1120 by Yung Ho RN  Outcome: Completed  1/13/2023 0902 by Yung Ho RN  Outcome: Progressing     Problem: Knowledge Deficit  Goal: Patient/family/caregiver demonstrates understanding of disease process, treatment plan, medications, and discharge instructions  Description: Complete learning assessment and assess knowledge base    Interventions:  - Provide teaching at level of understanding  - Provide teaching via preferred learning methods  1/13/2023 1120 by Yung Ho RN  Outcome: Completed  1/13/2023 0902 by Yung Ho RN  Outcome: Progressing

## 2023-01-13 NOTE — PLAN OF CARE
Problem: PAIN - ADULT  Goal: Verbalizes/displays adequate comfort level or baseline comfort level  Description: Interventions:  - Encourage patient to monitor pain and request assistance  - Assess pain using appropriate pain scale  - Administer analgesics based on type and severity of pain and evaluate response  - Implement non-pharmacological measures as appropriate and evaluate response  - Consider cultural and social influences on pain and pain management  - Notify physician/advanced practitioner if interventions unsuccessful or patient reports new pain  Outcome: Progressing     Problem: INFECTION - ADULT  Goal: Absence or prevention of progression during hospitalization  Description: INTERVENTIONS:  - Assess and monitor for signs and symptoms of infection  - Monitor lab/diagnostic results  - Monitor all insertion sites, i e  indwelling lines, tubes, and drains  - Monitor endotracheal if appropriate and nasal secretions for changes in amount and color  - [place] appropriate cooling/warming therapies per order  - Administer medications as ordered  - Instruct and encourage patient and family to use good hand hygiene technique  - Identify and instruct in appropriate isolation precautions for identified infection/condition  Outcome: Progressing  Goal: Absence of fever/infection during neutropenic period  Description: INTERVENTIONS:  - Monitor WBC    Outcome: Progressing     Problem: SAFETY ADULT  Goal: Patient will remain free of falls  Description: INTERVENTIONS:  - Educate patient/family on patient safety including physical limitations  - Instruct patient to call for assistance with activity   - Consult OT/PT to assist with strengthening/mobility   - Keep Call bell within reach  - Keep bed low and locked with side rails adjusted as appropriate  - Keep care items and personal belongings within reach  - Initiate and maintain comfort rounds  - Make Fall Risk Sign visible to staff  - Apply yellow socks and bracelet for high fall risk patients  - Consider moving patient to room near nurses station  Outcome: Progressing  Goal: Maintain or return to baseline ADL function  Description: INTERVENTIONS:  -  Assess patient's ability to carry out ADLs; assess patient's baseline for ADL function and identify physical deficits which impact ability to perform ADLs (bathing, care of mouth/teeth, toileting, grooming, dressing, etc )  - Assess/evaluate cause of self-care deficits   - Assess range of motion  - Assess patient's mobility; develop plan if impaired  - Assess patient's need for assistive devices and provide as appropriate  - Encourage maximum independence but intervene and supervise when necessary  - Involve family in performance of ADLs  - Assess for home care needs following discharge   - Consider OT consult to assist with ADL evaluation and planning for discharge  - Provide patient education as appropriate  Outcome: Progressing  Goal: Maintains/Returns to pre admission functional level  Description: INTERVENTIONS:  - Perform BMAT or MOVE assessment daily    - Set and communicate daily mobility goal to care team and patient/family/caregiver     - Collaborate with rehabilitation services on mobility goals if consulted  - Out of bed for toileting  - Record patient progress and toleration of activity level   Outcome: Progressing     Problem: DISCHARGE PLANNING  Goal: Discharge to home or other facility with appropriate resources  Description: INTERVENTIONS:  - Identify barriers to discharge w/patient and caregiver  - Arrange for needed discharge resources and transportation as appropriate  - Identify discharge learning needs (meds, wound care, etc )  - Arrange for interpretive services to assist at discharge as needed  - Refer to Case Management Department for coordinating discharge planning if the patient needs post-hospital services based on physician/advanced practitioner order or complex needs related to functional status, cognitive ability, or social support system  Outcome: Progressing     Problem: Knowledge Deficit  Goal: Patient/family/caregiver demonstrates understanding of disease process, treatment plan, medications, and discharge instructions  Description: Complete learning assessment and assess knowledge base    Interventions:  - Provide teaching at level of understanding  - Provide teaching via preferred learning methods  Outcome: Progressing

## 2023-01-13 NOTE — LACTATION NOTE
This note was copied from a baby's chart  Mother verbalized breastfeeding is going well  C/O sore nipple  Information given about sore nipples and how to correct with positioning techniques  Discussed maneuvers to latch infant on properly to avoid nipple pain and promote healing  Discussed treatments that could be utilized to promote healing  Hydro gel dressings given with instruction and verbalization of understanding of cleaning and duration of use  Verbal review of  positioning infant up at chest level and starting to feed infant with nose arriving at the nipple  Then, using areolar compression to achieve a deep latch that is comfortable and exchanges optimum amounts of milk  Has Family breastfeeding support at bedside  Denies need for assistance at this time   Encouraged calling for latch assist     Enc to call for assistance as needed,phone # given

## 2023-01-19 LAB — PLACENTA IN STORAGE: NORMAL

## 2023-01-20 ENCOUNTER — ROUTINE PRENATAL (OUTPATIENT)
Dept: OBGYN CLINIC | Facility: CLINIC | Age: 38
End: 2023-01-20

## 2023-01-20 VITALS
HEIGHT: 66 IN | SYSTOLIC BLOOD PRESSURE: 112 MMHG | DIASTOLIC BLOOD PRESSURE: 62 MMHG | BODY MASS INDEX: 28.8 KG/M2 | WEIGHT: 179.2 LBS

## 2023-01-20 DIAGNOSIS — Z98.891 S/P REPEAT LOW TRANSVERSE C-SECTION: Primary | ICD-10-CM

## 2023-01-20 NOTE — PROGRESS NOTES
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  She denies fevers/chills, signs of infection  [place] is very light    Past Medical History:   Diagnosis Date   • Canker sores oral    • Chickenpox    • Pap smear for cervical cancer screening     -wnl per pt; 2022-wnl, HRHPV neg   • Seasonal allergies        Past Surgical History:   Procedure Laterality Date   •  SECTION  2021     shortly after birth--elective C/S at 22 weks due to multiple medical problems with fetus     • [place]  DELIVERY ONLY N/A 2023    Procedure:  SECTION () REPEAT;  Surgeon: Ilan Braun MD;  Location: [facility];  Service: Obstetrics   • WISDOM TOOTH EXTRACTION             OB History    Para Term  AB Living   2 2   2   1   SAB IAB Ectopic Multiple Live Births         0 2      # Outcome Date GA Lbr Santy/2nd Weight Sex Delivery Anes PTL Lv   2  23 36w6d  2710 g (5 lb 15 6 oz) M CS-LTranv Spinal  ANTHONY   1  21    M CS-Unspec  N ND      Obstetric Comments   Menarche: 12-13      Menses: 25-28/5-6/regular pad every 5 hours           Current Outpatient Medications:   •  Calcium-Magnesium-Vitamin D - MG-MG-UNIT TB24, One daily, Disp: 90 tablet, Rfl: 3  •  docusate sodium (COLACE) 100 mg capsule, Take 1 capsule (100 mg total) by mouth 2 (two) times a day, Disp: , Rfl: 0  •  Ferrous Sulfate (IRON PO), Take by mouth, Disp: , Rfl:   •  ibuprofen (MOTRIN) 600 mg tablet, Take 1 tablet (600 mg total) by mouth every 6 (six) hours as needed for moderate pain, Disp: 30 tablet, Rfl: 0  •  MAGNESIUM ASPARTATE PO, Take by mouth, Disp: , Rfl:   •  calcium carbonate (TUMS) 500 mg chewable tablet, Chew 2 tablets (1,000 mg total) daily as needed for indigestion or heartburn (Patient not taking: Reported on 1/20/2023), Disp: , Rfl: 0    No Known Allergies    Social History     Socioeconomic History   • Marital status: /Civil Union     Spouse name: Verner Scull   • Number of children: 0   • Years of education: None   • Highest education level:  Bachelor's degree (e g , BA, AB, BS)   Occupational History   • Occupation:  in [employer]   Tobacco Use   • Smoking status: Never   • Smokeless tobacco: Former   • Tobacco comments:     Hookah prior to pregnancy   Vaping Use   • Vaping Use: Never used   Substance and Sexual Activity   • Alcohol use: Not Currently     Comment: none witih pregnancy, rarely prior   • Drug use: Never   • Sexual activity: Yes     Partners: Male     Birth control/protection: None     Comment: lifetime partners: 1   Other Topics Concern   • None   Social History Narrative    Anglican: Adventism    Accepts blood products     Social Determinants of Health     Financial Resource Strain: Not on file   Food Insecurity: Not on file   Transportation Needs: Not on file   Physical Activity: Not on file   Stress: Not on file   Social Connections: Not on file   Intimate Partner Violence: Not on file   Housing Stability: Not on file       Family History   Problem Relation Age of Onset   • Hypertension Mother    • Hyperlipidemia Mother    • Heart disease Father         CABG   • Supraventricular tachycardia Father         required ablation   • No Known Problems Sister    • No Known Problems Brother    • Hypertension Maternal Grandmother    • Deafness Maternal Grandmother         elderly--age 80   • Dementia Maternal Grandmother    • Hypertension Maternal Grandfather    • Prostate cancer Maternal Grandfather    • Hypertension Paternal Grandmother    • Hyperlipidemia Paternal Grandmother    • Diabetes type II Paternal Grandmother    • Heart attack Paternal Grandfather    • Stroke Paternal Grandfather         with paralysis   • Breast cancer Neg Hx    • Ovarian cancer Neg Hx    • Colon cancer Neg Hx        Review of Systems   Constitutional: Negative for chills, fatigue, fever and unexpected weight change  HENT: Negative for congestion, mouth sores and sore throat  Respiratory: Negative for cough, chest tightness, shortness of breath and wheezing  Cardiovascular: Negative for chest pain and palpitations  Gastrointestinal: Negative for abdominal distention, abdominal pain, constipation, diarrhea, nausea and vomiting  Endocrine: Negative for cold intolerance and heat intolerance  Genitourinary: Positive for vaginal bleeding (scant)  Negative for dyspareunia (not active since delivery), dysuria, genital sores, menstrual problem, pelvic pain, vaginal discharge and vaginal pain  Musculoskeletal: Negative for arthralgias  Skin: Negative for color change and rash  Neurological: Negative for dizziness, light-headedness and headaches  Hematological: Negative for adenopathy  Blood pressure 112/62, height 5' 6" (1 676 m), weight 81 3 kg (179 lb 3 2 oz), last menstrual period 04/28/2022, currently breastfeeding  and Body mass index is 28 92 kg/m²  Physical Exam  Constitutional:       General: She is not in acute distress  Appearance: Normal appearance  She is not ill-appearing  HENT:      Head: Normocephalic and atraumatic  Eyes:      Extraocular Movements: Extraocular movements intact  Conjunctiva/sclera: Conjunctivae normal    Pulmonary:      Effort: Pulmonary effort is normal    Abdominal:      General: There is no distension  Palpations: Abdomen is soft  There is no mass  Tenderness: There is no abdominal tenderness  There is no guarding or rebound  Hernia: No hernia is present        Comments: Incision is C/D/I   Musculoskeletal:         General: Normal range of motion  Cervical back: Normal range of motion  Skin:     General: Skin is warm  Findings: No erythema or rash  Neurological:      Mental Status: She is alert and oriented to person, place, and time  Psychiatric:         Mood and Affect: Mood normal          Behavior: Behavior normal          Thought Content: Thought content normal          Judgment: Judgment normal              A/P:  Pt is a 40 y o   with      Elie Renteria was seen today for post-op      Diagnoses and all orders for this visit:    S/P repeat low transverse     doing well

## 2023-01-30 LAB
DME PARACHUTE DELIVERY DATE REQUESTED: NORMAL
DME PARACHUTE ITEM DESCRIPTION: NORMAL
DME PARACHUTE ORDER STATUS: NORMAL
DME PARACHUTE SUPPLIER NAME: NORMAL
DME PARACHUTE SUPPLIER PHONE: NORMAL

## 2023-02-17 ENCOUNTER — POSTPARTUM VISIT (OUTPATIENT)
Dept: OBGYN CLINIC | Facility: CLINIC | Age: 38
End: 2023-02-17

## 2023-02-17 VITALS
DIASTOLIC BLOOD PRESSURE: 70 MMHG | WEIGHT: 176.6 LBS | HEIGHT: 66 IN | HEART RATE: 80 BPM | BODY MASS INDEX: 28.38 KG/M2 | SYSTOLIC BLOOD PRESSURE: 118 MMHG

## 2023-02-17 DIAGNOSIS — Z30.011 ORAL CONTRACEPTION INITIAL PRESCRIPTION: ICD-10-CM

## 2023-02-17 PROBLEM — Z98.891 S/P REPEAT LOW TRANSVERSE C-SECTION: Status: RESOLVED | Noted: 2023-01-11 | Resolved: 2023-02-17

## 2023-02-17 PROBLEM — Z3A.36 36 WEEKS GESTATION OF PREGNANCY: Status: RESOLVED | Noted: 2022-12-19 | Resolved: 2023-02-17

## 2023-02-17 RX ORDER — ACETAMINOPHEN AND CODEINE PHOSPHATE 120; 12 MG/5ML; MG/5ML
1 SOLUTION ORAL DAILY
Qty: 84 TABLET | Refills: 1 | Status: SHIPPED | OUTPATIENT
Start: 2023-02-17

## 2023-02-17 NOTE — PATIENT INSTRUCTIONS
While breast feeding continue to take your prenatal vitamin  Follow up with Gynecologist in [facility]

## 2023-02-17 NOTE — PROGRESS NOTES
Assessment/Plan:    1  Postpartum exam  Normal exam   She will establish care with a GYN in [facility]  2  Oral contraception initial prescription  Rx sent, advised to start right away, take same time daily  - norethindrone (Ortho [facility]ron[facility]) 0 35 MG tablet; Take 1 tablet (0 35 mg total) by mouth daily  Dispense: 84 tablet; Refill: 1    *discussed DVT prevention on flight to [facility]  To continue PN vit, iron and calcium supps  Subjective:      Patient ID: Nina Howard is a 40 y o  female  HPI  POSTPARTUM EXAM    40 y o  [id number] who presents for postoperative visit s/p R  Section on 2023  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, 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    She and baby are doing well  No concerns  She will be returning to [facility] and is not sure if she will be returning  Breast feeding: Breast feeding and using formula sometimes  G/U problems: No issues   Bowels: No issues   Bleeding: Some light brown spotting; no menstrual cycle yet   Pain: No pain along the incision, no vaginal pain; complains of low back pain   Sex: No  PPD score: 0  Contraception: Rhythm and coitus interruptus -- counseled as to lower efficacy of these methods  She is willing to start a progesterone only pill  The following portions of the patient's history were reviewed and updated as appropriate: allergies, current medications, past family history, past medical history, past social history, past surgical history and problem list     Review of Systems   Constitutional: Negative for chills and fever  Respiratory: Negative for cough and shortness of breath  Gastrointestinal: Negative  Genitourinary: Negative for dyspareunia, dysuria, frequency, genital sores, hematuria, menstrual problem, pelvic pain, urgency, vaginal bleeding, vaginal discharge and vaginal pain  Musculoskeletal: Negative for arthralgias and myalgias           Objective:    /70 (BP Location: Right arm, Patient Position: Sitting, Cuff Size: Adult)   Pulse 80   Ht 5' 6" (1 676 m)   Wt 80 1 kg (176 lb 9 6 oz)   LMP 04/28/2022 (Exact Date)   Breastfeeding Yes   BMI 28 50 kg/m²      Physical Exam  Constitutional:       General: She is not in acute distress  Appearance: Normal appearance  She is well-developed  She is not ill-appearing or diaphoretic  Comments: bmi 28 5   HENT:      Head: Normocephalic and atraumatic  Eyes:      Pupils: Pupils are equal, round, and reactive to light  Pulmonary:      Effort: Pulmonary effort is normal    Chest:   Breasts:     Right: Normal       Left: Normal    Abdominal:      General: Abdomen is flat  Palpations: Abdomen is soft  Genitourinary:     General: Normal vulva  Exam position: Lithotomy position  Labia:         Right: No rash, tenderness, lesion or injury  Left: No rash, tenderness, lesion or injury  Vagina: No signs of injury and foreign body  No vaginal discharge, erythema, tenderness or bleeding  Cervix: No cervical motion tenderness, discharge or friability  Uterus: Not enlarged and not tender  Adnexa:         Right: No mass or tenderness  Left: No mass or tenderness  Skin:     General: Skin is warm and dry  Neurological:      General: No focal deficit present  Mental Status: She is alert and oriented to person, place, and time  Psychiatric:         Mood and Affect: Mood normal          Behavior: Behavior normal          Thought Content:  Thought content normal          Judgment: Judgment normal

## 2024-03-05 ENCOUNTER — OFFICE VISIT (OUTPATIENT)
Dept: FAMILY MEDICINE CLINIC | Facility: CLINIC | Age: 39
End: 2024-03-05
Payer: MEDICARE

## 2024-03-05 VITALS
RESPIRATION RATE: 14 BRPM | SYSTOLIC BLOOD PRESSURE: 110 MMHG | BODY MASS INDEX: 27.32 KG/M2 | HEIGHT: 66 IN | HEART RATE: 82 BPM | WEIGHT: 170 LBS | DIASTOLIC BLOOD PRESSURE: 68 MMHG | TEMPERATURE: 98.2 F | OXYGEN SATURATION: 98 %

## 2024-03-05 DIAGNOSIS — Z11.59 NEED FOR HEPATITIS C SCREENING TEST: ICD-10-CM

## 2024-03-05 DIAGNOSIS — M19.90 ARTHRITIS: ICD-10-CM

## 2024-03-05 DIAGNOSIS — R01.1 HEART MURMUR: ICD-10-CM

## 2024-03-05 DIAGNOSIS — K21.9 GASTROESOPHAGEAL REFLUX DISEASE WITHOUT ESOPHAGITIS: ICD-10-CM

## 2024-03-05 DIAGNOSIS — J30.9 ALLERGIC RHINITIS, UNSPECIFIED SEASONALITY, UNSPECIFIED TRIGGER: Primary | ICD-10-CM

## 2024-03-05 DIAGNOSIS — R00.2 PALPITATIONS: ICD-10-CM

## 2024-03-05 DIAGNOSIS — F41.9 ANXIETY: ICD-10-CM

## 2024-03-05 DIAGNOSIS — E04.1 THYROID NODULE: ICD-10-CM

## 2024-03-05 DIAGNOSIS — R53.83 OTHER FATIGUE: ICD-10-CM

## 2024-03-05 DIAGNOSIS — J32.8 OTHER CHRONIC SINUSITIS: ICD-10-CM

## 2024-03-05 LAB — SL AMB POCT HEMOGLOBIN AIC: 5.4 (ref ?–6.5)

## 2024-03-05 PROCEDURE — 99214 OFFICE O/P EST MOD 30 MIN: CPT | Performed by: INTERNAL MEDICINE

## 2024-03-05 PROCEDURE — 93000 ELECTROCARDIOGRAM COMPLETE: CPT | Performed by: INTERNAL MEDICINE

## 2024-03-05 PROCEDURE — 83036 HEMOGLOBIN GLYCOSYLATED A1C: CPT | Performed by: INTERNAL MEDICINE

## 2024-03-05 RX ORDER — PROPRANOLOL HYDROCHLORIDE 10 MG/1
10 TABLET ORAL 2 TIMES DAILY
Qty: 60 TABLET | Refills: 3 | Status: SHIPPED | OUTPATIENT
Start: 2024-03-05

## 2024-03-05 NOTE — PROGRESS NOTES
Name: Steph Callahan      : 1985      MRN: [id number]  Encounter Provider: Tushar Henning MD  Encounter Date: 3/5/2024   Encounter department: [facility] CARE [facility]    Assessment & Plan     1. Allergic rhinitis, unspecified seasonality, unspecified trigger    2. Need for hepatitis C screening test  -     Hepatitis C Antibody; Future    3. Other chronic sinusitis  -     XR sinuses < 3 vw; Future; Expected date: 2024  -     IgE; Future  -     Allergy Evaluation 1, [place]; Future  -     Food Allergy Profile; Future    4. Thyroid nodule  -     TSH, 3rd generation; Future; Expected date: 2024  -     T4, free; Future; Expected date: 2024  -     US thyroid; Future; Expected date: 2024  -     Thyroid Antibodies Panel; Future  -     POCT hemoglobin A1c    5. Gastroesophageal reflux disease without esophagitis  -     Ferritin; Future  -     H. pylori antigen, stool; Future  -     POCT hemoglobin A1c    6. Heart murmur  -     POCT hemoglobin A1c  -     POCT ECG  -     propranolol (INDERAL) 10 mg tablet; Take 1 tablet (10 mg total) by mouth 2 (two) times a day  -     Holter monitor; Future; Expected date: 2024  -     Echo complete w/ contrast if indicated; Future; Expected date: 2024    7. Palpitations  -     POCT ECG  -     propranolol (INDERAL) 10 mg tablet; Take 1 tablet (10 mg total) by mouth 2 (two) times a day  -     Holter monitor; Future; Expected date: 2024  -     Echo complete w/ contrast if indicated; Future; Expected date: 2024    8. Anxiety  -     propranolol (INDERAL) 10 mg tablet; Take 1 tablet (10 mg total) by mouth 2 (two) times a day    9. Arthritis  -     ASO Screen w/ reflex to Titer; Future  -     Sedimentation rate, automated; Future  -     C-reactive protein; Future  -     RF Screen w/ Reflex to Titer; Future  -     HLA-B27 antigen; Future  -     MINA w/Reflex if Positive; Future  -     Anti-DNA antibody, double-stranded;  Future  -     Sjogren's Antibodies; Future    10. Other fatigue  -     Vitamin B12; Future  -     Vitamin D 25 hydroxy; Future; Expected date: 03/05/2024  -     Magnesium; Future  -     Lipid panel; Future; Expected date: 03/12/2024  -     CBC and differential; Future; Expected date: 03/12/2024  -     Comprehensive metabolic panel; Future; Expected date: 09/05/2024    Life style mod  RTC in 1-2 mos w Blood work       Subjective      38 Y O Lady is here for First time in my Office, Complete H/P discussed w Pt in detail, she has few symptoms,...      Review of Systems   Constitutional:  Positive for fatigue. Negative for chills and fever.   HENT:  Positive for postnasal drip. Negative for congestion, facial swelling, sore throat, trouble swallowing and voice change.    Eyes:  Negative for pain, discharge and visual disturbance.   Respiratory:  Positive for shortness of breath. Negative for cough and wheezing.    Cardiovascular:  Positive for palpitations. Negative for chest pain and leg swelling.   Gastrointestinal:  Negative for abdominal pain, blood in stool, constipation, diarrhea and nausea.   Endocrine: Negative for polydipsia, polyphagia and polyuria.   Genitourinary:  Negative for difficulty urinating, hematuria and urgency.   Musculoskeletal:  Positive for arthralgias, back pain and myalgias.   Skin:  Negative for rash.   Neurological:  Positive for dizziness. Negative for tremors, weakness and headaches.   Hematological:  Negative for adenopathy. Does not bruise/bleed easily.   Psychiatric/Behavioral:  Negative for dysphoric mood, sleep disturbance and suicidal ideas.        Current Outpatient Medications on File Prior to Visit   Medication Sig    [DISCONTINUED] calcium carbonate (TUMS) 500 mg chewable tablet Chew 2 tablets (1,000 mg total) daily as needed for indigestion or heartburn    [DISCONTINUED] Elastic Bandages & Supports (Carpal Tunnel Wrist Stabilizer) MISC Use daily    [DISCONTINUED] Ferrous  "Sulfate (IRON PO) Take by mouth    [DISCONTINUED] ibuprofen (MOTRIN) 600 mg tablet Take 1 tablet (600 mg total) by mouth every 6 (six) hours as needed for moderate pain    [DISCONTINUED] loratadine (CLARITIN) 10 mg tablet Take 1 tablet (10 mg total) by mouth daily    [DISCONTINUED] norethindrone (Ortho Micronor) 0.35 MG tablet Take 1 tablet (0.35 mg total) by mouth daily    [DISCONTINUED] Calcium-Magnesium-Vitamin D - MG-MG-UNIT TB24 One daily (Patient not taking: Reported on 2/17/2023)    [DISCONTINUED] docusate sodium (COLACE) 100 mg capsule Take 1 capsule (100 mg total) by mouth 2 (two) times a day (Patient not taking: Reported on 2/17/2023)    [DISCONTINUED] ibuprofen (MOTRIN) 400 mg tablet Take 1 tablet (400 mg total) by mouth 3 (three) times a day with meals for 10 days    [DISCONTINUED] MAGNESIUM ASPARTATE PO Take by mouth (Patient not taking: Reported on 2/17/2023)       Objective     /68 (BP Location: Left arm, Patient Position: Sitting, Cuff Size: Standard)   Pulse 82   Temp 98.2 °F (36.8 °C) (Tympanic)   Resp 14   Ht 5' 6\" (1.676 m)   Wt 77.1 kg (170 lb)   SpO2 98%   BMI 27.44 kg/m²     Physical Exam  Constitutional:       General: She is not in acute distress.  HENT:      Head: Normocephalic.      Mouth/Throat:      Pharynx: No oropharyngeal exudate.   Eyes:      General: No scleral icterus.     Conjunctiva/sclera: Conjunctivae normal.      Pupils: Pupils are equal, round, and reactive to light.   Neck:      Thyroid: No thyromegaly.   Cardiovascular:      Rate and Rhythm: Normal rate and regular rhythm.      Heart sounds: Murmur heard.   Pulmonary:      Effort: Pulmonary effort is normal. No respiratory distress.      Breath sounds: Normal breath sounds. No wheezing or rales.   Abdominal:      General: Bowel sounds are normal. There is no distension.      Palpations: Abdomen is soft.      Tenderness: There is no abdominal tenderness. There is no guarding or rebound. "   Musculoskeletal:         General: Tenderness present.      Cervical back: Neck supple.   Lymphadenopathy:      Cervical: No cervical adenopathy.   Skin:     Coloration: Skin is not pale.      Findings: No rash.   Neurological:      Mental Status: She is alert and oriented to person, place, and time.      Sensory: No sensory deficit.      Motor: No weakness.       Tushar Henning MD

## 2024-03-15 ENCOUNTER — TELEPHONE (OUTPATIENT)
Age: 39
End: 2024-03-15

## 2024-03-15 NOTE — TELEPHONE ENCOUNTER
Pt called in asking how to schedule her ultrasound and other tests. I provided the central scheduling number.

## 2024-03-22 ENCOUNTER — TELEPHONE (OUTPATIENT)
Dept: FAMILY MEDICINE CLINIC | Facility: CLINIC | Age: 39
End: 2024-03-22

## 2024-03-22 DIAGNOSIS — R79.89 LOW VITAMIN D LEVEL: Primary | ICD-10-CM

## 2024-03-22 DIAGNOSIS — R79.0 LOW FERRITIN: ICD-10-CM

## 2024-03-22 LAB
25(OH)D3 SERPL-MCNC: 19 NG/ML (ref 30–100)
A ALTERNATA IGE QN: <0.1 KU/L
ALBUMIN SERPL-MCNC: 4.3 G/DL (ref 3.6–5.1)
ALBUMIN/GLOB SERPL: 1.7 (CALC) (ref 1–2.5)
ALMOND IGE QN: <0.1 KU/L
ALP SERPL-CCNC: 60 U/L (ref 31–125)
ALT SERPL-CCNC: 10 U/L (ref 6–29)
ANA PAT SER IF-IMP: ABNORMAL
ANA SER QL IF: POSITIVE
ANA TITR SER IF: ABNORMAL TITER
ASO AB SERPL-ACNC: <50 IU/ML
AST SERPL-CCNC: 12 U/L (ref 10–30)
BASOPHILS # BLD AUTO: 62 CELLS/UL (ref 0–200)
BASOPHILS NFR BLD AUTO: 0.8 %
BILIRUB SERPL-MCNC: 0.5 MG/DL (ref 0.2–1.2)
BUN SERPL-MCNC: 13 MG/DL (ref 7–25)
BUN/CREAT SERPL: NORMAL (CALC) (ref 6–22)
C HERBARUM IGE QN: <0.1 KU/L
CALCIUM SERPL-MCNC: 9 MG/DL (ref 8.6–10.2)
CASHEW NUT IGE QN: <0.1 KU/L
CAT DANDER IGE QN: <0.1 KU/L
CHLORIDE SERPL-SCNC: 105 MMOL/L (ref 98–110)
CHOLEST SERPL-MCNC: 213 MG/DL
CHOLEST/HDLC SERPL: 3.9 (CALC)
CO2 SERPL-SCNC: 27 MMOL/L (ref 20–32)
CODFISH IGE QN: <0.1 KU/L
COMMON RAGWEED IGE QN: <0.1 KU/L
CREAT SERPL-MCNC: 0.57 MG/DL (ref 0.5–0.97)
CRP SERPL-MCNC: 0.9 MG/L
D FARINAE IGE QN: <0.1 KU/L
DEPRECATED A ALTERNATA IGE RAST QL: 0
DEPRECATED ALMOND IGE RAST QL: 0
DEPRECATED C HERBARUM IGE RAST QL: 0
DEPRECATED CASHEW NUT IGE RAST QL: 0
DEPRECATED CAT DANDER IGE RAST QL: 0
DEPRECATED CODFISH IGE RAST QL: 0
DEPRECATED COMMON RAGWEED IGE RAST QL: 0
DEPRECATED D FARINAE IGE RAST QL: 0
DEPRECATED DOG DANDER IGE RAST QL: 0
DEPRECATED EGG WHITE IGE RAST QL: 0
DEPRECATED GOOSEFOOT IGE RAST QL: 0
DEPRECATED HAZELNUT IGE RAST QL: 0
DEPRECATED KENT BLUE GRASS IGE RAST QL: 0
DEPRECATED MILK IGE RAST QL: 0
DEPRECATED PEANUT IGE RAST QL: 0
DEPRECATED SALMON IGE RAST QL: 0
DEPRECATED SCALLOP IGE RAST QL: 0
DEPRECATED SESAME SEED IGE RAST QL: 0
DEPRECATED SHRIMP IGE RAST QL: 0
DEPRECATED SOYBEAN IGE RAST QL: 0
DEPRECATED TIMOTHY IGE RAST QL: 0
DEPRECATED TUNA IGE RAST QL: 0
DEPRECATED WALNUT IGE RAST QL: 0
DEPRECATED WHEAT IGE RAST QL: 0
DEPRECATED WHITE OAK IGE RAST QL: 0
DOG DANDER IGE QN: <0.1 KU/L
DSDNA AB SER-ACNC: 1 IU/ML
EGG WHITE IGE QN: <0.1 KU/L
ENA SS-A AB SER IA-ACNC: NORMAL AI
ENA SS-B AB SER IA-ACNC: NORMAL AI
EOSINOPHIL # BLD AUTO: 208 CELLS/UL (ref 15–500)
EOSINOPHIL NFR BLD AUTO: 2.7 %
ERYTHROCYTE [DISTWIDTH] IN BLOOD BY AUTOMATED COUNT: 12.4 % (ref 11–15)
ERYTHROCYTE [SEDIMENTATION RATE] IN BLOOD BY WESTERGREN METHOD: 6 MM/H
FERRITIN SERPL-MCNC: 10 NG/ML (ref 16–154)
GFR/BSA.PRED SERPLBLD CYS-BASED-ARV: 120 ML/MIN/1.73M2
GLOBULIN SER CALC-MCNC: 2.5 G/DL (CALC) (ref 1.9–3.7)
GLUCOSE SERPL-MCNC: 87 MG/DL (ref 65–99)
GOOSEFOOT IGE QN: <0.1 KU/L
HAZELNUT IGE QN: <0.1 KU/L
HCT VFR BLD AUTO: 38.9 % (ref 35–45)
HCV AB SERPL QL IA: NORMAL
HDLC SERPL-MCNC: 55 MG/DL
HGB BLD-MCNC: 13.3 G/DL (ref 11.7–15.5)
HLA-B27 QL FC: NEGATIVE
IGE SERPL-ACNC: 11 KU/L
KENT BLUE GRASS IGE QN: <0.1 KU/L
LDLC SERPL CALC-MCNC: 136 MG/DL (CALC)
LYMPHOCYTES # BLD AUTO: 2287 CELLS/UL (ref 850–3900)
LYMPHOCYTES NFR BLD AUTO: 29.7 %
MAGNESIUM SERPL-MCNC: 2.1 MG/DL (ref 1.5–2.5)
MCH RBC QN AUTO: 29 PG (ref 27–33)
MCHC RBC AUTO-ENTMCNC: 34.2 G/DL (ref 32–36)
MCV RBC AUTO: 84.9 FL (ref 80–100)
MILK IGE QN: <0.1 KU/L
MONOCYTES # BLD AUTO: 485 CELLS/UL (ref 200–950)
MONOCYTES NFR BLD AUTO: 6.3 %
NEUTROPHILS # BLD AUTO: 4659 CELLS/UL (ref 1500–7800)
NEUTROPHILS NFR BLD AUTO: 60.5 %
NONHDLC SERPL-MCNC: 158 MG/DL (CALC)
PEANUT IGE QN: <0.1 KU/L
PLATELET # BLD AUTO: 229 THOUSAND/UL (ref 140–400)
PMV BLD REES-ECKER: 11 FL (ref 7.5–12.5)
POTASSIUM SERPL-SCNC: 3.9 MMOL/L (ref 3.5–5.3)
PROT SERPL-MCNC: 6.8 G/DL (ref 6.1–8.1)
RBC # BLD AUTO: 4.58 MILLION/UL (ref 3.8–5.1)
RHEUMATOID FACT SERPL-ACNC: <14 IU/ML
SALMON IGE QN: <0.1 KU/L
SCALLOP IGE QN: <0.1 KU/L
SESAME SEED IGE QN: <0.1 KU/L
SHRIMP IGE QN: <0.1 KU/L
SODIUM SERPL-SCNC: 139 MMOL/L (ref 135–146)
SOYBEAN IGE QN: <0.1 KU/L
T4 FREE SERPL-MCNC: 0.9 NG/DL (ref 0.8–1.8)
THYROGLOB AB SERPL-ACNC: <1 IU/ML
THYROPEROXIDASE AB SERPL-ACNC: 3 IU/ML
TIMOTHY IGE QN: <0.1 KU/L
TRIGL SERPL-MCNC: 112 MG/DL
TSH SERPL-ACNC: 1.54 MIU/L
TUNA IGE QN: <0.1 KU/L
VIT B12 SERPL-MCNC: 623 PG/ML (ref 200–1100)
WALNUT IGE QN: <0.1 KU/L
WBC # BLD AUTO: 7.7 THOUSAND/UL (ref 3.8–10.8)
WHEAT IGE QN: <0.1 KU/L
WHITE OAK IGE QN: <0.1 KU/L

## 2024-03-22 RX ORDER — ERGOCALCIFEROL 1.25 MG/1
50000 CAPSULE ORAL WEEKLY
Qty: 12 CAPSULE | Refills: 2 | Status: SHIPPED | OUTPATIENT
Start: 2024-03-22

## 2024-03-22 NOTE — TELEPHONE ENCOUNTER
Spoke with the patient,  she will schedule appointment with GI and Hematology.  Informed her to  Vit D at the pharmacy.

## 2024-03-22 NOTE — TELEPHONE ENCOUNTER
----- Message from Tushar Henning MD sent at 3/22/2024  7:43 AM EDT -----  Hematology and GI Consults, also; Vit D supplements  ----- Message -----  From: Neema, [facility] Results In  Sent: 3/22/2024   1:00 AM EDT  To: Tushar Henning MD

## 2024-03-25 ENCOUNTER — TELEPHONE (OUTPATIENT)
Dept: HEMATOLOGY ONCOLOGY | Facility: CLINIC | Age: 39
End: 2024-03-25

## 2024-03-25 NOTE — TELEPHONE ENCOUNTER
I called Steph in response to a referral that was received for patient to establish care with Hematology.     Outreach was made to schedule a consultation.    I left a voicemail explaining the reason for my call and advised patient to call [facility] at [phone].  Another attempt will be made to contact patient.

## 2024-03-26 ENCOUNTER — HOSPITAL ENCOUNTER (OUTPATIENT)
Dept: ULTRASOUND IMAGING | Facility: HOSPITAL | Age: 39
Discharge: HOME | End: 2024-03-26
Payer: MEDICARE

## 2024-03-26 ENCOUNTER — HOSPITAL ENCOUNTER (OUTPATIENT)
Dept: NON INVASIVE DIAGNOSTICS | Facility: HOSPITAL | Age: 39
Discharge: HOME | End: 2024-03-26
Payer: MEDICARE

## 2024-03-26 ENCOUNTER — HOSPITAL ENCOUNTER (OUTPATIENT)
Dept: RADIOLOGY | Facility: HOSPITAL | Age: 39
Discharge: HOME | End: 2024-03-26
Payer: MEDICARE

## 2024-03-26 DIAGNOSIS — R00.2 PALPITATIONS: ICD-10-CM

## 2024-03-26 DIAGNOSIS — R01.1 HEART MURMUR: ICD-10-CM

## 2024-03-26 DIAGNOSIS — E04.1 THYROID NODULE: ICD-10-CM

## 2024-03-26 DIAGNOSIS — J32.8 OTHER CHRONIC SINUSITIS: ICD-10-CM

## 2024-03-26 PROCEDURE — 93226 XTRNL ECG REC<48 HR SCAN A/R: CPT

## 2024-03-26 PROCEDURE — 76536 US EXAM OF HEAD AND NECK: CPT

## 2024-03-26 PROCEDURE — 70210 X-RAY EXAM OF SINUSES: CPT

## 2024-03-26 PROCEDURE — 93225 XTRNL ECG REC<48 HRS REC: CPT

## 2024-03-27 ENCOUNTER — HOSPITAL ENCOUNTER (OUTPATIENT)
Dept: NON INVASIVE DIAGNOSTICS | Facility: HOSPITAL | Age: 39
Discharge: HOME | End: 2024-03-27
Payer: MEDICARE

## 2024-03-27 ENCOUNTER — TELEPHONE (OUTPATIENT)
Dept: HEMATOLOGY ONCOLOGY | Facility: CLINIC | Age: 39
End: 2024-03-27

## 2024-03-27 VITALS
SYSTOLIC BLOOD PRESSURE: 110 MMHG | WEIGHT: 170 LBS | HEIGHT: 66 IN | BODY MASS INDEX: 27.32 KG/M2 | HEART RATE: 80 BPM | DIASTOLIC BLOOD PRESSURE: 68 MMHG

## 2024-03-27 DIAGNOSIS — R01.1 HEART MURMUR: ICD-10-CM

## 2024-03-27 DIAGNOSIS — R00.2 PALPITATIONS: ICD-10-CM

## 2024-03-27 LAB
AORTIC ROOT: 2.9 CM
AORTIC VALVE MEAN VELOCITY: 11.2 M/S
APICAL FOUR CHAMBER EJECTION FRACTION: 64 %
AV AREA BY CONTINUOUS VTI: 2.2 CM2
AV AREA PEAK VELOCITY: 2.2 CM2
AV LVOT MEAN GRADIENT: 3 MMHG
AV LVOT PEAK GRADIENT: 8 MMHG
AV MEAN GRADIENT: 6 MMHG
AV PEAK GRADIENT: 10 MMHG
AV VALVE AREA: 2.21 CM2
AV VELOCITY RATIO: 0.87
BSA FOR ECHO PROCEDURE: 1.87 M2
DOP CALC AO PEAK VEL: 1.59 M/S
DOP CALC AO VTI: 28.96 CM
DOP CALC LVOT AREA: 2.54 CM2
DOP CALC LVOT CARDIAC INDEX: 2.6 L/MIN/M2
DOP CALC LVOT CARDIAC OUTPUT: 4.87 L/MIN
DOP CALC LVOT DIAMETER: 1.8 CM
DOP CALC LVOT PEAK VEL VTI: 25.19 CM
DOP CALC LVOT PEAK VEL: 1.38 M/S
DOP CALC LVOT STROKE INDEX: 33.7 ML/M2
DOP CALC LVOT STROKE VOLUME: 64.07
E WAVE DECELERATION TIME: 122 MS
E/A RATIO: 1.17
FRACTIONAL SHORTENING: 34 (ref 28–44)
INTERVENTRICULAR SEPTUM IN DIASTOLE (PARASTERNAL SHORT AXIS VIEW): 1 CM
INTERVENTRICULAR SEPTUM: 1 CM (ref 0.6–1.1)
LAAS-AP2: 17.6 CM2
LAAS-AP4: 18.7 CM2
LEFT ATRIUM AREA SYSTOLE SINGLE PLANE A4C: 18 CM2
LEFT ATRIUM SIZE: 2.7 CM
LEFT ATRIUM VOLUME (MOD BIPLANE): 58 ML
LEFT ATRIUM VOLUME INDEX (MOD BIPLANE): 31 ML/M2
LEFT INTERNAL DIMENSION IN SYSTOLE: 2.9 CM (ref 2.1–4)
LEFT VENTRICULAR INTERNAL DIMENSION IN DIASTOLE: 4.4 CM (ref 3.5–6)
LEFT VENTRICULAR POSTERIOR WALL IN END DIASTOLE: 0.9 CM
LEFT VENTRICULAR STROKE VOLUME: 58 ML
LVSV (TEICH): 58 ML
MV E'TISSUE VEL-LAT: 17 CM/S
MV E'TISSUE VEL-SEP: 10 CM/S
MV PEAK A VEL: 0.71 M/S
MV PEAK E VEL: 83 CM/S
MV STENOSIS PRESSURE HALF TIME: 35 MS
MV VALVE AREA P 1/2 METHOD: 6.29
RIGHT ATRIUM AREA SYSTOLE A4C: 12.2 CM2
RIGHT VENTRICLE ID DIMENSION: 3.4 CM
SL CV LEFT ATRIUM LENGTH A2C: 4.5 CM
SL CV PED ECHO LEFT VENTRICLE DIASTOLIC VOLUME (MOD BIPLANE) 2D: 89 ML
SL CV PED ECHO LEFT VENTRICLE SYSTOLIC VOLUME (MOD BIPLANE) 2D: 31 ML
TR MAX PG: 13 MMHG
TR PEAK VELOCITY: 1.8 M/S
TRICUSPID ANNULAR PLANE SYSTOLIC EXCURSION: 2.1 CM
TRICUSPID VALVE PEAK REGURGITATION VELOCITY: 1.77 M/S

## 2024-03-27 PROCEDURE — 93306 TTE W/DOPPLER COMPLETE: CPT | Performed by: INTERNAL MEDICINE

## 2024-03-27 PROCEDURE — 93306 TTE W/DOPPLER COMPLETE: CPT

## 2024-03-27 NOTE — TELEPHONE ENCOUNTER
Steph calling in response to a referral that was received for patient to establish care with Hematology.     Outreach was made to schedule a consultation.    A consultation was scheduled for patient during this call. Patient is scheduled on 5/15/24 at 10:40am with Dr Perrin at the [facility]      Patient requested to be seen by Dr Perrin only.  Patient declined sooner appointment and requested to be seen on a Tuesday or Wednesday only.

## 2024-04-02 PROCEDURE — 93227 XTRNL ECG REC<48 HR R&I: CPT | Performed by: STUDENT IN AN ORGANIZED HEALTH CARE EDUCATION/TRAINING PROGRAM

## 2024-04-17 ENCOUNTER — OFFICE VISIT (OUTPATIENT)
Dept: FAMILY MEDICINE CLINIC | Facility: CLINIC | Age: 39
End: 2024-04-17
Payer: MEDICARE

## 2024-04-17 VITALS
HEART RATE: 70 BPM | TEMPERATURE: 98.6 F | HEIGHT: 66 IN | RESPIRATION RATE: 14 BRPM | WEIGHT: 170 LBS | DIASTOLIC BLOOD PRESSURE: 80 MMHG | OXYGEN SATURATION: 98 % | SYSTOLIC BLOOD PRESSURE: 128 MMHG | BODY MASS INDEX: 27.32 KG/M2

## 2024-04-17 DIAGNOSIS — E55.9 VITAMIN D DEFICIENCY: ICD-10-CM

## 2024-04-17 DIAGNOSIS — M19.90 ARTHRITIS: ICD-10-CM

## 2024-04-17 DIAGNOSIS — Z00.01 ENCOUNTER FOR GENERAL ADULT MEDICAL EXAMINATION WITH ABNORMAL FINDINGS: Primary | ICD-10-CM

## 2024-04-17 DIAGNOSIS — E78.49 OTHER HYPERLIPIDEMIA: ICD-10-CM

## 2024-04-17 DIAGNOSIS — E04.1 THYROID NODULE: ICD-10-CM

## 2024-04-17 DIAGNOSIS — R76.8 POSITIVE ANA (ANTINUCLEAR ANTIBODY): ICD-10-CM

## 2024-04-17 PROCEDURE — 99395 PREV VISIT EST AGE 18-39: CPT | Performed by: INTERNAL MEDICINE

## 2024-04-17 PROCEDURE — 99214 OFFICE O/P EST MOD 30 MIN: CPT | Performed by: INTERNAL MEDICINE

## 2024-04-17 RX ORDER — ERGOCALCIFEROL 1.25 MG/1
50000 CAPSULE ORAL WEEKLY
Qty: 12 CAPSULE | Refills: 2 | Status: SHIPPED | OUTPATIENT
Start: 2024-04-17

## 2024-04-17 NOTE — PROGRESS NOTES
Name: Steph Callahan      : 1985      MRN: [id number]  Encounter Provider: Tushar Henning MD  Encounter Date: 2024   Encounter department: [facility] CARE [facility]    Assessment & Plan     1. Encounter for general adult medical examination with abnormal findings  Comments:  Done in Detail  Life style mod  RTC in 3 mos w Blood work  Orders:  -     Ambulatory Referral to Obstetrics / Gynecology; Future  -     Lipid panel; Future; Expected date: 2024  -     TSH, 3rd generation; Future; Expected date: 2024  -     T4, free; Future; Expected date: 2024  -     MINA w/Reflex if Positive; Future    2. Vitamin D deficiency  -     ergocalciferol (VITAMIN D2) 50,000 units; Take 1 capsule (50,000 Units total) by mouth once a week    3. Thyroid nodule  Comments:  Repeat in 12 mos  Orders:  -     Lipid panel; Future; Expected date: 2024  -     TSH, 3rd generation; Future; Expected date: 2024  -     T4, free; Future; Expected date: 2024  -     MINA w/Reflex if Positive; Future    4. Other hyperlipidemia  Comments:  life style Mod  RTC in 2-3 mos w Blood work  Orders:  -     Lipid panel; Future; Expected date: 2024  -     TSH, 3rd generation; Future; Expected date: 2024  -     T4, free; Future; Expected date: 2024  -     MINA w/Reflex if Positive; Future    5. Arthritis  Comments:  consider rheumatology  RTC in 2-3 mos w  X rays  Orders:  -     XR hand 3+ vw right; Future; Expected date: 2024  -     XR hand 3+ vw left; Future; Expected date: 2024  -     XR knee 3 vw right non injury; Future; Expected date: 2024  -     XR knee 3 vw left non injury; Future; Expected date: 2024  -     XR spine cervical complete 4 or 5 vw non injury; Future; Expected date: 2024  -     XR spine lumbar minimum 4 views non injury; Future; Expected date: 2024  -     Lipid panel; Future; Expected date: 2024  -     TSH, 3rd  generation; Future; Expected date: 04/17/2024  -     T4, free; Future; Expected date: 04/17/2024  -     MINA w/Reflex if Positive; Future    6. Positive MINA (antinuclear antibody)  Comments:  with arthritis ??  consider rheumatology consult  RTC in 2-3 mos w  X rays  Orders:  -     XR hand 3+ vw right; Future; Expected date: 04/17/2024  -     XR hand 3+ vw left; Future; Expected date: 04/17/2024  -     XR knee 3 vw right non injury; Future; Expected date: 04/17/2024  -     XR knee 3 vw left non injury; Future; Expected date: 04/17/2024  -     XR spine cervical complete 4 or 5 vw non injury; Future; Expected date: 04/17/2024  -     XR spine lumbar minimum 4 views non injury; Future; Expected date: 04/17/2024  -     Lipid panel; Future; Expected date: 04/24/2024  -     TSH, 3rd generation; Future; Expected date: 04/17/2024  -     T4, free; Future; Expected date: 04/17/2024  -     MINA w/Reflex if Positive; Future    Annual Physical Exam : Done in Detail  Life style mod  RTC in 2-3 mos w  Blood work and X rays       Subjective      38 Y O lady is here for Annual Physical exam and Regular check up, she has few symptoms, recent Blood work and med list reviewed w  pt In detail...      Review of Systems   Constitutional:  Negative for chills, fatigue and fever.   HENT:  Positive for postnasal drip. Negative for congestion, facial swelling, sore throat, trouble swallowing and voice change.    Eyes:  Negative for pain, discharge and visual disturbance.   Respiratory:  Negative for cough, shortness of breath and wheezing.    Cardiovascular:  Negative for chest pain, palpitations and leg swelling.   Gastrointestinal:  Negative for abdominal pain, blood in stool, constipation, diarrhea and nausea.   Endocrine: Negative for polydipsia, polyphagia and polyuria.   Genitourinary:  Negative for difficulty urinating, hematuria and urgency.   Musculoskeletal:  Positive for arthralgias, back pain, myalgias and neck pain.   Skin:   "Negative for rash.   Neurological:  Negative for dizziness, tremors, weakness and headaches.   Hematological:  Negative for adenopathy. Does not bruise/bleed easily.   Psychiatric/Behavioral:  Negative for dysphoric mood, sleep disturbance and suicidal ideas.        Current Outpatient Medications on File Prior to Visit   Medication Sig    [DISCONTINUED] ergocalciferol (VITAMIN D2) 50,000 units Take 1 capsule (50,000 Units total) by mouth once a week    [DISCONTINUED] propranolol (INDERAL) 10 mg tablet Take 1 tablet (10 mg total) by mouth 2 (two) times a day       Objective     /80 (BP Location: Left arm, Patient Position: Sitting, Cuff Size: Standard)   Pulse 70   Temp 98.6 °F (37 °C) (Tympanic)   Resp 14   Ht 5' 6\" (1.676 m)   Wt 77.1 kg (170 lb)   SpO2 98%   BMI 27.44 kg/m²     Physical Exam  Constitutional:       General: She is not in acute distress.  HENT:      Head: Normocephalic.      Mouth/Throat:      Pharynx: No oropharyngeal exudate.   Eyes:      General: No scleral icterus.     Conjunctiva/sclera: Conjunctivae normal.      Pupils: Pupils are equal, round, and reactive to light.   Neck:      Thyroid: No thyromegaly.   Cardiovascular:      Rate and Rhythm: Normal rate and regular rhythm.      Heart sounds: Normal heart sounds. No murmur heard.  Pulmonary:      Effort: Pulmonary effort is normal. No respiratory distress.      Breath sounds: Normal breath sounds. No wheezing or rales.   Abdominal:      General: Bowel sounds are normal. There is no distension.      Palpations: Abdomen is soft.      Tenderness: There is no abdominal tenderness. There is no guarding or rebound.   Musculoskeletal:         General: Tenderness present.      Cervical back: Neck supple.   Lymphadenopathy:      Cervical: No cervical adenopathy.   Skin:     Coloration: Skin is not pale.      Findings: No rash.   Neurological:      Mental Status: She is alert and oriented to person, place, and time.      Sensory: No " sensory deficit.      Motor: No weakness.       Tushar Henning MD

## 2024-04-18 ENCOUNTER — TELEPHONE (OUTPATIENT)
Age: 39
End: 2024-04-18

## 2024-04-18 NOTE — TELEPHONE ENCOUNTER
Patients GI provider:  Dr. Vigil    Number to return call: [phone]    Reason for call: Pt calling to get a sooner appt. Pt is a new pt.!  Pt said she was at her doctor yesterday and her doctor told her she needs a sooner appt. I offered an appt on 4/30/24 but the pt said her Dr said it needs to be sooner and to call our office to get in sooner. Please reach out to the pt if it is possible    Scheduled procedure/appointment date if applicable: Apt/5/8/24

## 2024-04-23 NOTE — TELEPHONE ENCOUNTER
FYI:  Patient currently scheduled for NP appt 5/8/24, labs completed in March and original referral to GI placed in March. I contacted patient, she continues with fatigue and requesting earlier. Patient scheduled for visit 4/30/24 with MAGDALENE Dawkins PA-C for consult since available appointment.

## 2024-04-24 ENCOUNTER — TELEPHONE (OUTPATIENT)
Dept: GASTROENTEROLOGY | Facility: MEDICAL CENTER | Age: 39
End: 2024-04-24

## 2024-04-24 ENCOUNTER — PREP FOR PROCEDURE (OUTPATIENT)
Dept: GASTROENTEROLOGY | Facility: MEDICAL CENTER | Age: 39
End: 2024-04-24

## 2024-04-24 ENCOUNTER — CONSULT (OUTPATIENT)
Dept: GASTROENTEROLOGY | Facility: MEDICAL CENTER | Age: 39
End: 2024-04-24
Payer: MEDICARE

## 2024-04-24 VITALS
SYSTOLIC BLOOD PRESSURE: 118 MMHG | DIASTOLIC BLOOD PRESSURE: 74 MMHG | OXYGEN SATURATION: 99 % | BODY MASS INDEX: 27.29 KG/M2 | WEIGHT: 169.8 LBS | TEMPERATURE: 98.1 F | HEART RATE: 69 BPM | HEIGHT: 66 IN

## 2024-04-24 DIAGNOSIS — R63.0 LACK OF APPETITE: Primary | ICD-10-CM

## 2024-04-24 DIAGNOSIS — R79.0 LOW FERRITIN: ICD-10-CM

## 2024-04-24 PROCEDURE — 99244 OFF/OP CNSLTJ NEW/EST MOD 40: CPT | Performed by: PHYSICIAN ASSISTANT

## 2024-04-24 RX ORDER — FERROUS SULFATE 324(65)MG
324 TABLET, DELAYED RELEASE (ENTERIC COATED) ORAL
Qty: 30 TABLET | Refills: 5 | Status: SHIPPED | OUTPATIENT
Start: 2024-04-24

## 2024-04-24 NOTE — PROGRESS NOTES
[facility] - Outpatient Consultation  Steph Callahan 38 y.o. female MRN: [id number]  Encounter: [id number]      Assessment and Plan    1.  Iron deficiency  2.  Lack of appetite  The patient had blood work obtained in March which revealed a ferritin of 10.  No iron panel was obtained.  CBC revealed normal hemoglobin.  The patient denies any overt GI bleeding.  Her only symptom is fatigue and lack of appetite.  She denies any unintentional weight loss.  She has not had a prior EGD or colonoscopy.  -Discussed with the patient that she is not anemic however given her lack of appetite and significant iron deficiency we can pursue further evaluation with EGD and colonoscopy, risks including bleeding, infection, bowel perforation, missed polyp were reviewed  -She has an upcoming appointment with hematology oncology which I encouraged her to keep in case she will need IV iron, in the meantime she can start oral iron supplementation    Follow-up after EGD and colonoscopy    ______________________________________________________________________    History of Present Illness  Steph Callahan is a 38 y.o. female here for consultation of iron deficiency.  The patient had blood work obtained in March which revealed a ferritin of 10.  No iron panel was obtained.  CBC revealed normal hemoglobin.  The patient denies any overt GI bleeding.  Her only symptom is lack of appetite.  She denies any unintentional weight loss.  She has not had a prior EGD or colonoscopy.      Review of Systems   Constitutional:  Positive for appetite change. Negative for activity change, chills, fatigue, fever and unexpected weight change.   Gastrointestinal:  Negative for abdominal distention, abdominal pain, anal bleeding, blood in stool, constipation, diarrhea, nausea, rectal pain and vomiting.   Musculoskeletal:  Negative for back pain and gait problem.   Psychiatric/Behavioral:  Negative for confusion.        Past Medical  History  Past Medical History:   Diagnosis Date    Canker sores oral     Chickenpox     Pap smear for cervical cancer screening     -wnl per pt; 2022-wnl, HRHPV neg    Seasonal allergies        Past Social history  Past Surgical History:   Procedure Laterality Date     SECTION  2021     shortly after birth--elective C/S at 25 weks due to multiple medical problems with fetus.    [place]  DELIVERY ONLY N/A 2023    Procedure:  SECTION () REPEAT;  Surgeon: Mandy Weber MD;  Location: [facility];  Service: Obstetrics    WISDOM TOOTH EXTRACTION           Social History     Socioeconomic History    Marital status: /Civil Union     Spouse name: Greg    Number of children: 0    Years of education: Not on file    Highest education level: Bachelor's degree (e.g., BA, AB, BS)   Occupational History    Occupation:  in [place]   Tobacco Use    Smoking status: Some Days     Types: Pipe    Smokeless tobacco: Current    Tobacco comments:     Hookah prior to pregnancy   Vaping Use    Vaping status: Never Used   Substance and Sexual Activity    Alcohol use: Yes     Comment: none witih pregnancy, rarely prior    Drug use: Never    Sexual activity: Yes     Partners: Male     Birth control/protection: None     Comment: lifetime partners: 1   Other Topics Concern    Not on file   Social History Narrative    Rastafari: [employer]    Accepts blood products     Social Determinants of Health     Financial Resource Strain: Not on file   Food Insecurity: Not on file   Transportation Needs: Not on file   Physical Activity: Not on file   Stress: Not on file   Social Connections: Not on file   Intimate Partner Violence: Not on file   Housing Stability: Not on file     Social History     Substance and Sexual Activity   Alcohol Use Yes    Comment: none witih pregnancy, rarely prior     Social History     Substance and Sexual Activity   Drug Use Never     Social History  "    Tobacco Use   Smoking Status Some Days    Types: Pipe   Smokeless Tobacco Current   Tobacco Comments    Hookah prior to pregnancy       Past Family History  Family History   Problem Relation Age of Onset    Hypertension Mother     Hyperlipidemia Mother     Heart disease Father         CABG    Supraventricular tachycardia Father         required ablation    No Known Problems Sister     No Known Problems Brother     Hypertension Maternal Grandmother     Deafness Maternal Grandmother         elderly--age 96    Dementia Maternal Grandmother     Hypertension Maternal Grandfather     Prostate cancer Maternal Grandfather     Hypertension Paternal Grandmother     Hyperlipidemia Paternal Grandmother     Diabetes type II Paternal Grandmother     Heart attack Paternal Grandfather     Stroke Paternal Grandfather         with paralysis    Breast cancer Neg Hx     Ovarian cancer Neg Hx     Colon cancer Neg Hx        Current Medications  Current Outpatient Medications   Medication Sig Dispense Refill    ergocalciferol (VITAMIN D2) 50,000 units Take 1 capsule (50,000 Units total) by mouth once a week 12 capsule 2     No current facility-administered medications for this visit.       Allergies  No Known Allergies      The following portions of the patient's history were reviewed and updated as appropriate: allergies, current medications, past medical history, past social history, past surgical history and problem list.      Vitals  Vitals:    04/24/24 1115   BP: 118/74   BP Location: Right arm   Patient Position: Sitting   Cuff Size: Standard   Pulse: 69   Temp: 98.1 °F (36.7 °C)   TempSrc: Tympanic   SpO2: 99%   Weight: 77 kg (169 lb 12.8 oz)   Height: 5' 6\" (1.676 m)         Physical Exam  Constitutional   General appearance: Patient is seated and in no acute distress, well appearing and well nourished.   Head and Face   Head and face: Normal.    Eyes   Conjunctiva and lids: No erythema, swelling or discharge.  " Anicteric.  Ears, Nose, Mouth, and Throat   Hearing: Normal.    Neck: Supple, trachea midline.  Pulmonary   Respiratory effort: No increased work of breathing or signs of respiratory distress.    Cardiovascular   Examination of extremities for edema and/or varicosities: Normal.    Musculoskeletal   Gait and station: Normal   Skin   Skin and subcutaneous tissue: Warm, dry, and intact. No visible jaundice, lesions or rashes.  Psychiatric   Judgment and insight: Normal  Recent and remote memory:  Normal  Mood and affect: Normal      Results  No visits with results within 1 Day(s) from this visit.   Latest known visit with results is:   Hospital Outpatient Visit on 03/27/2024   Component Date Value    BSA 03/27/2024 1.87     A4C EF 03/27/2024 64     LVOT stroke volume 03/27/2024 64.07     LVOT stroke volume index 03/27/2024 33.70     LVOT Cardiac Output 03/27/2024 4.87     LVOT Cardiac Index 03/27/2024 2.60     LVIDd 03/27/2024 4.40     LVIDS 03/27/2024 2.90     IVSd 03/27/2024 1.00     LVPWd 03/27/2024 0.90     LVOT diameter 03/27/2024 1.8     LVOT peak VTI 03/27/2024 25.19     FS 03/27/2024 34     MV E' Tissue Velocity Se* 03/27/2024 10     MV E' Tissue Velocity La* 03/27/2024 17     LA Volume Index (BP) 03/27/2024 31.0     E/A ratio 03/27/2024 1.17     E wave deceleration time 03/27/2024 122     MV Peak E Bart 03/27/2024 83     MV Peak A Bart 03/27/2024 0.71     AV LVOT peak gradient 03/27/2024 8     LVOT peak bart 03/27/2024 1.38     RVID d 03/27/2024 3.4     Tricuspid annular plane * 03/27/2024 2.10     LA size 03/27/2024 2.7     LA length (A2C) 03/27/2024 4.50     EVELINA A4C 03/27/2024 18     LA volume (BP) 03/27/2024 58     RAA A4C 03/27/2024 12.2     Aortic valve peak veloci* 03/27/2024 1.59     Ao VTI 03/27/2024 28.96     AV mean gradient 03/27/2024 6     LVOT mn grad 03/27/2024 3.0     AV peak gradient 03/27/2024 10     AV area by cont VTI 03/27/2024 2.2     AV area peak bart 03/27/2024 2.2     MV stenosis pressure  1/2* 03/27/2024 35     MV valve area p 1/2 meth* 03/27/2024 6.29     TR Peak Bart 03/27/2024 1.8     Triscuspid Valve Regurgi* 03/27/2024 13.0     Ao root 03/27/2024 2.90     Aortic valve mean veloci* 03/27/2024 11.20     Tricuspid valve peak reg* 03/27/2024 1.77     Left ventricular stroke * 03/27/2024 58.00     IVS 03/27/2024 1     LEFT VENTRICLE SYSTOLIC * 03/27/2024 31     LV DIASTOLIC VOLUME (MOD* 03/27/2024 89     Left Atrium Area-systoli* 03/27/2024 18.7     Left Atrium Area-systoli* 03/27/2024 17.6     LVSV, 2D 03/27/2024 58     LVOT area 03/27/2024 2.54     DVI 03/27/2024 0.87     AV valve area 03/27/2024 2.21        Radiology Results  US thyroid    Result Date: 4/3/2024  Narrative: THYROID ULTRASOUND INDICATION: E04.1: Nontoxic single thyroid nodule. COMPARISON: None TECHNIQUE: Ultrasound of the thyroid was performed with a high frequency linear transducer in transverse and sagittal planes including volumetric imaging sweeps as well as traditional still imaging technique. FINDINGS: Thyroid texture: Normal homogeneous smooth echotexture. Right lobe: 5.0 x 1.1 x 1.9 cm. Volume 5.1 mL Left lobe: 4.8 x 0.9 x 1.4 cm. Volume 3.0 mL Isthmus: 0.2 cm. Nodule #1. Image 43. LEFT midgland nodule measuring 0.4 x 0.3 x 0.3 cm.  No priors for comparison. COMPOSITION: 0 points, cystic or almost completely cystic. ECHOGENICITY: 0 points, anechoic. SHAPE: 0 points, wider-than-tall. MARGIN: 0 points, smooth. ECHOGENIC FOCI: 0 points, none or large comet-tail artifacts. TI-RADS Classification: TR 1 (0 points), Benign. No FNA. There are no nodules that meet current ACR criteria for biopsy or follow-up.     Impression: No nodule meets current ACR criteria for requiring biopsy or follow-up ultrasounds. Reference: ACR Thyroid Imaging, Reporting and Data System (TI-RADS): White Paper of the ACR TI-RADS Committee. J AM Lyndsey Radiol 2017;14:587-595. Additional recommendations based on American Thyroid Association 2015 guidelines.  Workstation performed: [id number]     Holter monitor    Result Date: 2024  Narrative: PT NAME: Steph Callahan : 1985  AGE: 38 y.o.  GENDER: female MRN: [id number]   PROCEDURE: Holter monitor INDICATIONS: Palpitations DESCRIPTION OF FINDINGS: The patient was monitored for a total of 48 hours.  The patient was predominantly in sinus rhythm throughout the study.  The average heart rate was 82 beats per minute.  The heart rate ranged from a low of 55 beats per minute to a maximum of 128 beats per minute. Ventricular ectopic activity consisted of 0 beats. There was no sustained or nonsustained ventricular tachycardia. Supraventricular ectopic activity consisted of 39 beats, of which 26 were single PACs, and 13 were late beats. There was no supraventricular tachycardia identified.  There was no evidence of atrial fibrillation or atrial flutter. There were no significant pauses. The longest R-R interval was 1.3 seconds.  There was no evidence of advanced degree heart block. The accompanying patient diary notes no symptoms.     Impression: Predominantly sinus rhythm, with an average heart rate of 82 beats per minute No significant number of premature atrial contractions No premature ventricular contractions No significant pauses or advanced degree heart block Reading physician: Edgar Seay MD, [facility]     XR sinuses < 3 vw    Result Date: 3/31/2024  Narrative: PARANASAL SINUSES INDICATION:  J32.8: Other chronic sinusitis. COMPARISON:  None VIEWS:  XR SINUSES < 3 VW Images: 5 FINDINGS: No evidence of sinus opacification or air-fluid levels. No lytic or blastic lesions are identified.     Impression: No evidence of sinusitis. Workstation performed: [id number]     Echo complete w/ contrast if indicated    Result Date: 3/27/2024  Narrative: Technically good quality transthoracic echocardiogram study. Normal left atrial size and systolic and diastolic function.  Left ventricle ejection fraction is estimated around  62%. Normal right ventricle size and systolic function. Normal left and right atrial cavity size. Normal aortic valve, no arctic valve stenosis or regurgitation. Normal mitral valve.  Trace mitral valve regurgitation. Trace tricuspid valve regurgitation. No obvious pulmonary hypertension. No pericardial effusion. No previous echocardiogram is available for comparison.      Orders  No orders of the defined types were placed in this encounter.

## 2024-04-24 NOTE — TELEPHONE ENCOUNTER
Procedure: EGD/ Colonoscopy  Date: 05/30/2024  Physician performing: Dr. Choi  Location of procedure:  [facility]  Instructions given to patient: Yes  Diabetic: No  Clearances: N/A

## 2024-04-26 ENCOUNTER — TELEPHONE (OUTPATIENT)
Dept: FAMILY MEDICINE CLINIC | Facility: CLINIC | Age: 39
End: 2024-04-26

## 2024-04-26 DIAGNOSIS — R76.8 ANA POSITIVE: Primary | ICD-10-CM

## 2024-04-26 LAB
ANA PAT SER IF-IMP: ABNORMAL
ANA SER QL IF: POSITIVE
ANA TITR SER IF: ABNORMAL TITER
CHOLEST SERPL-MCNC: 199 MG/DL
CHOLEST/HDLC SERPL: 4.2 (CALC)
HDLC SERPL-MCNC: 47 MG/DL
LDLC SERPL CALC-MCNC: 134 MG/DL (CALC)
NONHDLC SERPL-MCNC: 152 MG/DL (CALC)
T4 FREE SERPL-MCNC: 0.9 NG/DL (ref 0.8–1.8)
TRIGL SERPL-MCNC: 79 MG/DL
TSH SERPL-ACNC: 1.32 MIU/L

## 2024-04-26 NOTE — TELEPHONE ENCOUNTER
----- Message from Tushar Henning MD sent at 4/26/2024  7:35 AM EDT -----  Rheumatology consult  ----- Message -----  From: Neema, Tyler Lab Results In  Sent: 4/26/2024   4:15 AM EDT  To: Tushar Henning MD

## 2024-04-26 NOTE — PROGRESS NOTES
ASSESSMENT & PLAN: Steph Callahan is a 38 y.o.  with normal gynecologic exam.    1.  Routine well woman exam done today  2.  Pap and HPV:  The patient's last pap and hpv was 22.    It was normal.    Pap and cotesting was not done today.    Current ASCCP Guidelines reviewed.   3.  The following were reviewed in today's visit: family planning choices.  PNVs were prescribed.   4. Patient is currently taking iron for hx of anemia, has follow up with heme.     CC:  Annual Gynecologic Examination    HPI: Steph Callahan is a 38 y.o.  who presents for annual gynecologic examination.  She has the following concerns:  heavy menstrual bleeding.  She is currently on PO iron.  She desires pregnancy at this time.  She is being worked up for rheumatologic disorder.  She still has breast milk.  Advised to decrease stimulation and apply cold packs.        BMI Counseling: Body mass index is 27.44 kg/m². The BMI is above normal. Nutrition recommendations include decreasing portion sizes. Rationale for BMI follow-up plan is due to patient being overweight or obese.     Depression Screening and Follow-up Plan: Patient was screened for depression during today's encounter. They screened negative with a PHQ-2 score of 1.    Tobacco Cessation Counseling: Tobacco cessation counseling was provided. The patient is sincerely urged to quit consumption of tobacco. She is not ready to quit tobacco. Medication options not discussed.   She smokes hookah occasionally.         Health Maintenance:  She wears her seatbelt routinely.    She does not perform regular monthly self breast exams.    She feels safe at home.     Past Medical History:   Diagnosis Date    Canker sores oral     Chickenpox     Pap smear for cervical cancer screening     -wnl per pt; 2022-wnl, HRHPV neg    Seasonal allergies        Past Surgical History:   Procedure Laterality Date     SECTION  2021     shortly after birth--elective  C/S at 25 weks due to multiple medical problems with fetus.    [place]  DELIVERY ONLY N/A 2023    Procedure:  SECTION () REPEAT;  Surgeon: Mandy Weber MD;  Location: [facility];  Service: Obstetrics    WISDOM TOOTH EXTRACTION             Past OB/Gyn History:  OB History          2    Para   2    Term           2    AB        Living   1         SAB        IAB        Ectopic        Multiple   0    Live Births   2           Obstetric Comments   Menarche: [age]    Menses: 25-28/5-6/regular pad every 5 hours             Pt has menstrual issues- heavy periods, last 6 days   History of sexually transmitted infection: No.  History of abnormal pap smears: No .    Patient is currently sexually active.  heterosexual.  The current method of family planning is none.    Family History   Problem Relation Age of Onset    Hypertension Mother     Hyperlipidemia Mother     Heart disease Father         CABG    Supraventricular tachycardia Father         required ablation    No Known Problems Sister     No Known Problems Brother     Hypertension Maternal Grandmother     Deafness Maternal Grandmother         elderly--age 96    Dementia Maternal Grandmother     Hypertension Maternal Grandfather     Prostate cancer Maternal Grandfather     Hypertension Paternal Grandmother     Hyperlipidemia Paternal Grandmother     Diabetes type II Paternal Grandmother     Heart attack Paternal Grandfather     Stroke Paternal Grandfather         with paralysis    Breast cancer Neg Hx     Ovarian cancer Neg Hx     Colon cancer Neg Hx        Social History:  Social History     Socioeconomic History    Marital status: /Civil Union     Spouse name: Greg    Number of children: 0    Years of education: Not on file    Highest education level: Bachelor's degree (e.g., BA, AB, BS)   Occupational History    Occupation:  in [place]   Tobacco Use    Smoking status: Some Days     Types: Pipe    Smokeless  tobacco: Current    Tobacco comments:     Hookah    Vaping Use    Vaping status: Never Used   Substance and Sexual Activity    Alcohol use: Not Currently     Comment: socially    Drug use: Never    Sexual activity: Yes     Partners: Male     Birth control/protection: None     Comment: lifetime partners: 1   Other Topics Concern    Not on file   Social History Narrative    Judaism: Denominational    Accepts blood products     Social Determinants of Health     Financial Resource Strain: Not on file   Food Insecurity: Not on file   Transportation Needs: Not on file   Physical Activity: Not on file   Stress: Not on file   Social Connections: Not on file   Intimate Partner Violence: Not on file   Housing Stability: Not on file     Presently lives with her  and son.  Patient is .  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    No Known Allergies      Current Outpatient Medications:     ergocalciferol (VITAMIN D2) 50,000 units, Take 1 capsule (50,000 Units total) by mouth once a week, Disp: 12 capsule, Rfl: 2    ferrous sulfate 324 (65 Fe) mg, Take 1 tablet (324 mg total) by mouth 2 (two) times a day before meals, Disp: 30 tablet, Rfl: 5    Prenatal Vit-Fe Fumarate-FA (Prenatal Plus Vitamin/Mineral) 27-1 MG TABS, Take 1 tablet by mouth in the morning, Disp: 90 tablet, Rfl: 3      Review of Systems  Constitutional :no fever, feels ok + tiredness, no recent weight gain or loss  ENT: no ear ache, no loss of hearing, no nosebleeds or nasal discharge, no sore throat or hoarseness.  Cardiovascular: no complaints of slow or fast heart beat, no chest pain, no palpitations, no leg claudication or lower extremity edema.  Respiratory: no complaints of shortness of shortness of breath, no CRAVEN  Breasts:no complaints of breast pain, breast lump, or nipple discharge  Gastrointestinal: no complaints of abdominal pain, constipation, nausea, vomiting, or diarrhea or bloody stools  Genitourinary : no complaints of dysuria,  "incontinence, pelvic pain, she is currently menstruating  Musculoskeletal: +complaints of arthralgia, no myalgia, +joint swelling  and stiffness, no limb pain or swelling.  Integumentary: no complaints of skin rash or lesion, itching or dry skin  Neurological: no complaints of headache, no confusion, no numbness or tingling, no dizziness or fainting    Objective      /69 (BP Location: Right arm, Patient Position: Sitting, Cuff Size: Adult)   Pulse 76   Resp 18   Ht 5' 6\" (1.676 m)   Wt 77.1 kg (170 lb)   LMP 04/28/2024   BMI 27.44 kg/m²   General:   appears stated age, cooperative, alert normal mood and affect   Neck: normal, supple,trachea midline, no masses   Heart: regular rate and rhythm, S1, S2 normal, no murmur, click, rub or gallop   Lungs: clear to auscultation bilaterally   Breasts: normal appearance, no masses or tenderness   Abdomen: soft, non-tender, without masses or organomegaly   Vulva: deferred   Vagina: deferred   Urethra: deferred   Cervix: deferred   Uterus: deferred   Adnexa: deferred   Lymphatic palpation of lymph nodes in neck, axilla, groin and/or other locations: no lymphadenopathy or masses noted   Skin normal skin turgor and no rashes.   Psychiatric orientation to person, place, and time: normal. mood and affect: normal      "

## 2024-04-29 ENCOUNTER — OFFICE VISIT (OUTPATIENT)
Dept: OBGYN CLINIC | Facility: CLINIC | Age: 39
End: 2024-04-29

## 2024-04-29 VITALS
HEIGHT: 66 IN | WEIGHT: 170 LBS | SYSTOLIC BLOOD PRESSURE: 106 MMHG | HEART RATE: 76 BPM | RESPIRATION RATE: 18 BRPM | BODY MASS INDEX: 27.32 KG/M2 | DIASTOLIC BLOOD PRESSURE: 69 MMHG

## 2024-04-29 DIAGNOSIS — Z31.9 PATIENT DESIRES PREGNANCY: ICD-10-CM

## 2024-04-29 DIAGNOSIS — Z01.419 ENCOUNTER FOR GYNECOLOGICAL EXAMINATION (GENERAL) (ROUTINE) WITHOUT ABNORMAL FINDINGS: Primary | ICD-10-CM

## 2024-04-29 DIAGNOSIS — N92.0 MENORRHAGIA WITH REGULAR CYCLE: ICD-10-CM

## 2024-04-29 PROBLEM — O34.219 HISTORY OF CESAREAN DELIVERY, ANTEPARTUM: Status: RESOLVED | Noted: 2022-11-30 | Resolved: 2024-04-29

## 2024-04-29 PROBLEM — O09.523 ADVANCED MATERNAL AGE IN MULTIGRAVIDA, THIRD TRIMESTER: Status: RESOLVED | Noted: 2022-11-30 | Resolved: 2024-04-29

## 2024-04-29 PROBLEM — O28.5 ABNORMAL CHROMOSOMAL AND GENETIC FINDING ON ANTENATAL SCREENING MOTHER: Status: RESOLVED | Noted: 2022-11-30 | Resolved: 2024-04-29

## 2024-04-29 PROCEDURE — 99385 PREV VISIT NEW AGE 18-39: CPT | Performed by: OBSTETRICS & GYNECOLOGY

## 2024-04-29 RX ORDER — VITAMIN A ACETATE, .BETA.-CAROTENE, ASCORBIC ACID, CHOLECALCIFEROL, .ALPHA.-TOCOPHEROL ACETATE, DL-, THIAMINE MONONITRATE, RIBOFLAVIN, NIACINAMIDE, PYRIDOXINE HYDROCHLORIDE, FOLIC ACID, CYANOCOBALAMIN, CALCIUM CARBONATE, FERROUS FUMARATE, ZINC OXIDE, CUPRIC OXIDE 9.9; 120; 920; 200; 400; 2; 12; 27; 1; 20; 10; 3; 1.84; 3080; 25 MG/1; MG/1; [IU]/1; MG/1; [IU]/1; MG/1; UG/1; MG/1; MG/1; MG/1; MG/1; MG/1; MG/1; [IU]/1; MG/1
1 TABLET, FILM COATED ORAL DAILY
Qty: 90 TABLET | Refills: 3 | Status: SHIPPED | OUTPATIENT
Start: 2024-04-29

## 2024-05-01 ENCOUNTER — HOSPITAL ENCOUNTER (OUTPATIENT)
Dept: RADIOLOGY | Facility: HOSPITAL | Age: 39
Discharge: HOME | End: 2024-05-01
Payer: MEDICARE

## 2024-05-01 DIAGNOSIS — M19.90 ARTHRITIS: ICD-10-CM

## 2024-05-01 DIAGNOSIS — R76.8 POSITIVE ANA (ANTINUCLEAR ANTIBODY): ICD-10-CM

## 2024-05-01 PROCEDURE — 73562 X-RAY EXAM OF KNEE 3: CPT

## 2024-05-01 PROCEDURE — 72050 X-RAY EXAM NECK SPINE 4/5VWS: CPT

## 2024-05-01 PROCEDURE — 72110 X-RAY EXAM L-2 SPINE 4/>VWS: CPT

## 2024-05-01 PROCEDURE — 73130 X-RAY EXAM OF HAND: CPT

## 2024-05-15 ENCOUNTER — TELEPHONE (OUTPATIENT)
Dept: HEMATOLOGY ONCOLOGY | Facility: CLINIC | Age: 39
End: 2024-05-15

## 2024-05-15 ENCOUNTER — CONSULT (OUTPATIENT)
Dept: HEMATOLOGY ONCOLOGY | Facility: CLINIC | Age: 39
End: 2024-05-15
Payer: MEDICARE

## 2024-05-15 VITALS
DIASTOLIC BLOOD PRESSURE: 74 MMHG | BODY MASS INDEX: 27.64 KG/M2 | TEMPERATURE: 98.4 F | RESPIRATION RATE: 17 BRPM | WEIGHT: 172 LBS | HEIGHT: 66 IN | SYSTOLIC BLOOD PRESSURE: 116 MMHG | OXYGEN SATURATION: 98 % | HEART RATE: 85 BPM

## 2024-05-15 DIAGNOSIS — R79.0 LOW FERRITIN: ICD-10-CM

## 2024-05-15 DIAGNOSIS — R79.0 LOW FERRITIN LEVEL: Primary | ICD-10-CM

## 2024-05-15 PROCEDURE — 99243 OFF/OP CNSLTJ NEW/EST LOW 30: CPT | Performed by: INTERNAL MEDICINE

## 2024-05-15 RX ORDER — SODIUM CHLORIDE 9 MG/ML
20 INJECTION, SOLUTION INTRAVENOUS ONCE
Status: CANCELLED | OUTPATIENT
Start: 2024-05-17

## 2024-05-15 NOTE — PROGRESS NOTES
Oncology Consult Note  Steph Callahan 38 y.o. female MRN: [id number]  Unit/Bed#:  Encounter: [id number]      Presenting Complaint: Low ferritin level    History of Presenting Illness: Steph Callahan is seen for initial consultation 5/15/2024 at the referral of Tushar Henning MD   38-year-old female with history of miscarriage, , full-term pregnancy in 2023 with subsequent bleeding, menorrhagia with menses lasting 5 to 6 days    Fatigue, arthralgia, morning stiffness, swelling of the hands and the feet    As workup she was found to have low ferritin level of 10, WBC 7.7, hemoglobin 13.3, MCV 84, platelets 229,000    She does not smoke or drink    She tried oral iron pills with constipation, cramps, intermittent hemorrhoid bleeding from constipation    Also she was found to have positive MINA 1; 320 nuclear dense fine speckled scheduled to be evaluated by rheumatology in 2024    She reported thinning of the hair, fatigue, needing to take naps  Review of Systems - As stated in the HPI otherwise the fourteen point review of systems was negative.    Past Medical History:   Diagnosis Date    Canker sores oral     Chickenpox     Pap smear for cervical cancer screening     -wnl per pt; 2022-wnl, HRHPV neg    Seasonal allergies        Social History     Socioeconomic History    Marital status: /Civil Union     Spouse name: Greg    Number of children: 0    Years of education: Not on file    Highest education level: Bachelor's degree (e.g., BA, AB, BS)   Occupational History    Occupation:  in [place]   Tobacco Use    Smoking status: Some Days     Types: Pipe    Smokeless tobacco: Current    Tobacco comments:     Hookah    Vaping Use    Vaping status: Never Used   Substance and Sexual Activity    Alcohol use: Not Currently     Comment: socially    Drug use: Never    Sexual activity: Yes     Partners: Male     Birth control/protection: None     Comment: lifetime partners: 1  "  Other Topics Concern    Not on file   Social History Narrative    Evangelical: Gnosticism    Accepts blood products     Social Determinants of Health     Financial Resource Strain: Not on file   Food Insecurity: Not on file   Transportation Needs: Not on file   Physical Activity: Not on file   Stress: Not on file   Social Connections: Not on file   Intimate Partner Violence: Not on file   Housing Stability: Not on file       Family History   Problem Relation Age of Onset    Hypertension Mother     Hyperlipidemia Mother     Heart disease Father         CABG    Supraventricular tachycardia Father         required ablation    No Known Problems Sister     No Known Problems Brother     Hypertension Maternal Grandmother     Deafness Maternal Grandmother         elderly--age 96    Dementia Maternal Grandmother     Hypertension Maternal Grandfather     Prostate cancer Maternal Grandfather     Hypertension Paternal Grandmother     Hyperlipidemia Paternal Grandmother     Diabetes type II Paternal Grandmother     Heart attack Paternal Grandfather     Stroke Paternal Grandfather         with paralysis    Breast cancer Neg Hx     Ovarian cancer Neg Hx     Colon cancer Neg Hx        No Known Allergies      Current Outpatient Medications:     ergocalciferol (VITAMIN D2) 50,000 units, Take 1 capsule (50,000 Units total) by mouth once a week, Disp: 12 capsule, Rfl: 2    Prenatal Vit-Fe Fumarate-FA (Prenatal Plus Vitamin/Mineral) 27-1 MG TABS, Take 1 tablet by mouth in the morning, Disp: 90 tablet, Rfl: 3      /74 (BP Location: Left arm, Patient Position: Sitting, Cuff Size: Adult)   Pulse 85   Temp 98.4 °F (36.9 °C) (Temporal)   Resp 17   Ht 5' 6\" (1.676 m)   Wt 78 kg (172 lb)   LMP 04/28/2024   SpO2 98%   BMI 27.76 kg/m²       General Appearance:    Alert, oriented        Eyes:    PERRL   Ears:    Normal external ear canals, both ears   Nose:   Nares normal, septum midline   Throat:   Mucosa moist. Pharynx without " injection.    Neck:   Supple       Lungs:     Clear to auscultation bilaterally   Chest Wall:    No tenderness or deformity    Heart:    Regular rate and rhythm       Abdomen:     Soft, non-tender, bowel sounds +, no organomegaly           Extremities:   Extremities no cyanosis or edema       Skin:   no rash or icterus.  Swelling of the metacarpal joints bilaterally   Lymph nodes:   Cervical, supraclavicular, and axillary nodes normal   Neurologic:   CNII-XII intact, normal strength, sensation and reflexes     Throughout               No results found for this or any previous visit (from the past 48 hour(s)).      XR knee 3 vw right non injury    Result Date: 5/10/2024  Narrative: XR KNEE 3 VW RIGHT NON INJURY INDICATION: M19.90: Unspecified osteoarthritis, unspecified site R76.8: Other specified abnormal immunological findings in serum. COMPARISON: None FINDINGS: No acute fracture or dislocation. No joint effusion. No significant degenerative changes. No lytic or blastic osseous lesion. Unremarkable soft tissues.     Impression: No acute osseous abnormality. Workstation performed: [id number]     XR hand 3+ vw right    Result Date: 5/2/2024  Narrative: RIGHT HAND INDICATION:   Unspecified osteoarthritis, unspecified site. Other specified abnormal immunological findings in serum. COMPARISON:  None. VIEWS:  XR HAND 3+ VW RIGHT For the purposes of institution wide universal language the following terms will apply: (thumb=1st digit/finger, index finger=2nd digit/finger, long finger=3rd digit/finger, ring=4th digit/finger and small finger=5th digit/finger) FINDINGS: There is no acute fracture or dislocation. No significant degenerative changes. Normal mineralization. No discrete erosive changes. No lytic or blastic osseous lesion. Soft tissues are unremarkable.     Impression: No acute osseous abnormality. Electronically signed: 05/02/2024 12:10 AM Dima Frey MD    XR hand 3+ vw left    Result Date:  5/2/2024  Narrative: LEFT HAND INDICATION:   Unspecified osteoarthritis, unspecified site. Other specified abnormal immunological findings in serum. COMPARISON:  None. VIEWS:  XR HAND 3+ VW LEFT For the purposes of institution wide universal language the following terms will apply: (thumb=1st digit/finger, index finger=2nd digit/finger, long finger=3rd digit/finger, ring=4th digit/finger and small finger=5th digit/finger) FINDINGS: There is no acute fracture or dislocation. No significant degenerative changes. Normal mineralization. No discrete erosive changes. No lytic or blastic osseous lesion. Soft tissues are unremarkable.     Impression: No acute osseous abnormality. Electronically signed: 05/02/2024 12:09 AM Dima Frey MD    XR spine cervical complete 4 or 5 vw non injury    Result Date: 5/2/2024  Narrative: CERVICAL SPINE INDICATION:   Unspecified osteoarthritis, unspecified site. Other specified abnormal immunological findings in serum. COMPARISON: None. VIEWS:  XR SPINE CERVICAL COMPLETE 4 OR 5 VW NON INJURY FINDINGS: No fracture. Slight straightening of the normally expected cervical lordosis. Slight disc space narrowing at C5-C6. The neuroforamina are patent. The prevertebral soft tissues are within normal limits.  The lung apices are clear.     Impression: No acute radiographic findings in the cervical spine. Electronically signed: 05/02/2024 12:08 AM Dima Frey MD    XR spine lumbar minimum 4 views non injury    Result Date: 5/1/2024  Narrative: LUMBAR SPINE INDICATION:   Unspecified osteoarthritis, unspecified site. Other specified abnormal immunological findings in serum. COMPARISON:  None. VIEWS:  XR SPINE LUMBAR MINIMUM 4 VIEWS NON INJURY FINDINGS: There are 5 non rib bearing lumbar vertebral bodies. There is no evidence of acute fracture or destructive osseous lesion. Alignment is unremarkable. No significant lumbar degenerative change noted. The pedicles appear intact. No pars defects. Soft  tissues are unremarkable.     Impression: Normal examination. Electronically signed: 05/01/2024 08:17 PM Giacomo Martel MD    XR knee 3 vw left non injury    Result Date: 5/1/2024  Narrative: LEFT KNEE INDICATION:   Unspecified osteoarthritis, unspecified site. Other specified abnormal immunological findings in serum. COMPARISON:  None. VIEWS:  XR KNEE 3 VW LEFT NON INJURY FINDINGS: There is no acute fracture or dislocation. There is no joint effusion. No significant degenerative changes. No lytic or blastic osseous lesion. Soft tissues are unremarkable.     Impression: No acute osseous abnormality. Electronically signed: 05/01/2024 02:24 PM Deepak Frey MD    ECOG :0      Assessment and plan:    1.  Low ferritin level from menorrhagia, the patient was instructed correctly to take iron pills complicated with constipation, abdominal cramps, bleeding hemorrhoids, ferritin level of 10 despite iron pills    With this complication we suggest stopping iron pills and start the patient on Venofer 200 mg IV x 3 doses only    2.  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  Follow-up on as-needed basis

## 2024-05-16 ENCOUNTER — TELEPHONE (OUTPATIENT)
Dept: INFUSION CENTER | Facility: CLINIC | Age: 39
End: 2024-05-16

## 2024-05-16 ENCOUNTER — ANESTHESIA EVENT (OUTPATIENT)
Dept: ANESTHESIOLOGY | Facility: HOSPITAL | Age: 39
End: 2024-05-16

## 2024-05-16 ENCOUNTER — ANESTHESIA (OUTPATIENT)
Dept: ANESTHESIOLOGY | Facility: HOSPITAL | Age: 39
End: 2024-05-16

## 2024-05-16 NOTE — TELEPHONE ENCOUNTER
Called and spoke with patient to confirm appointment for 5/17/24 at 08:30 for Venofer. Discussed location of the infusion center, projected length of appointment, visitor policy, and availability of snacks, drinks, and WiFi. All questions answered.

## 2024-05-17 ENCOUNTER — TELEPHONE (OUTPATIENT)
Dept: GASTROENTEROLOGY | Facility: MEDICAL CENTER | Age: 39
End: 2024-05-17

## 2024-05-17 ENCOUNTER — HOSPITAL ENCOUNTER (OUTPATIENT)
Dept: INFUSION CENTER | Facility: CLINIC | Age: 39
End: 2024-05-17
Payer: MEDICARE

## 2024-05-17 VITALS
HEART RATE: 71 BPM | SYSTOLIC BLOOD PRESSURE: 108 MMHG | TEMPERATURE: 97.4 F | RESPIRATION RATE: 18 BRPM | DIASTOLIC BLOOD PRESSURE: 70 MMHG

## 2024-05-17 DIAGNOSIS — R79.0 LOW FERRITIN LEVEL: Primary | ICD-10-CM

## 2024-05-17 RX ORDER — SODIUM CHLORIDE 9 MG/ML
20 INJECTION, SOLUTION INTRAVENOUS ONCE
Status: COMPLETED | OUTPATIENT
Start: 2024-05-17 | End: 2024-05-17

## 2024-05-17 RX ORDER — SODIUM CHLORIDE 9 MG/ML
20 INJECTION, SOLUTION INTRAVENOUS ONCE
Status: CANCELLED | OUTPATIENT
Start: 2024-05-24

## 2024-05-17 RX ADMIN — IRON SUCROSE 200 MG: 20 INJECTION, SOLUTION INTRAVENOUS at 08:56

## 2024-05-17 RX ADMIN — SODIUM CHLORIDE 20 ML/HR: 0.9 INJECTION, SOLUTION INTRAVENOUS at 08:56

## 2024-05-17 NOTE — TELEPHONE ENCOUNTER
Called patient to confirm procedure EGD/colonoscopy with Dr. Choi for 05/30/2024. Patient stated would like to cancel appt and see how she does with iron infusions. Patient also said she is going to call back to reschedule.

## 2024-05-17 NOTE — PROGRESS NOTES
New patient here for #1/3 venofer IVP, tolerated well, had headache at the end 4/10 and after 15 minutes lessened to 2/10.  Pt aware to call office if any more side effects.  Next appt made 5-24-24 3:30, AVS declined.

## 2024-05-20 ENCOUNTER — CONSULT (OUTPATIENT)
Dept: RHEUMATOLOGY | Facility: CLINIC | Age: 39
End: 2024-05-20
Payer: MEDICARE

## 2024-05-20 ENCOUNTER — APPOINTMENT (OUTPATIENT)
Dept: LAB | Facility: CLINIC | Age: 39
End: 2024-05-20
Payer: MEDICARE

## 2024-05-20 VITALS
DIASTOLIC BLOOD PRESSURE: 78 MMHG | WEIGHT: 168 LBS | SYSTOLIC BLOOD PRESSURE: 114 MMHG | HEART RATE: 75 BPM | BODY MASS INDEX: 27 KG/M2 | HEIGHT: 66 IN | OXYGEN SATURATION: 100 %

## 2024-05-20 DIAGNOSIS — M19.90 INFLAMMATORY ARTHRITIS: Primary | ICD-10-CM

## 2024-05-20 DIAGNOSIS — R76.8 ANA POSITIVE: ICD-10-CM

## 2024-05-20 DIAGNOSIS — M19.90 INFLAMMATORY ARTHRITIS: ICD-10-CM

## 2024-05-20 LAB
BACTERIA UR QL AUTO: ABNORMAL /HPF
BILIRUB UR QL STRIP: NEGATIVE
C3 SERPL-MCNC: 135 MG/DL (ref 87–200)
C4 SERPL-MCNC: 35 MG/DL (ref 19–52)
CLARITY UR: CLEAR
COLOR UR: COLORLESS
CREAT UR-MCNC: 48.1 MG/DL
GLUCOSE UR STRIP-MCNC: NEGATIVE MG/DL
HGB UR QL STRIP.AUTO: NEGATIVE
KETONES UR STRIP-MCNC: NEGATIVE MG/DL
LEUKOCYTE ESTERASE UR QL STRIP: ABNORMAL
NITRITE UR QL STRIP: NEGATIVE
NON-SQ EPI CELLS URNS QL MICRO: ABNORMAL /HPF
PH UR STRIP.AUTO: 6 [PH]
PROT UR STRIP-MCNC: NEGATIVE MG/DL
PROT UR-MCNC: <4 MG/DL
RBC #/AREA URNS AUTO: ABNORMAL /HPF
SP GR UR STRIP.AUTO: 1.01 (ref 1–1.03)
UROBILINOGEN UR STRIP-ACNC: <2 MG/DL
WBC #/AREA URNS AUTO: ABNORMAL /HPF

## 2024-05-20 PROCEDURE — 36415 COLL VENOUS BLD VENIPUNCTURE: CPT

## 2024-05-20 PROCEDURE — 86803 HEPATITIS C AB TEST: CPT

## 2024-05-20 PROCEDURE — 99244 OFF/OP CNSLTJ NEW/EST MOD 40: CPT | Performed by: INTERNAL MEDICINE

## 2024-05-20 PROCEDURE — 86235 NUCLEAR ANTIGEN ANTIBODY: CPT

## 2024-05-20 PROCEDURE — 86160 COMPLEMENT ANTIGEN: CPT

## 2024-05-20 PROCEDURE — 86705 HEP B CORE ANTIBODY IGM: CPT

## 2024-05-20 PROCEDURE — 81001 URINALYSIS AUTO W/SCOPE: CPT

## 2024-05-20 PROCEDURE — 86800 THYROGLOBULIN ANTIBODY: CPT

## 2024-05-20 PROCEDURE — 82570 ASSAY OF URINE CREATININE: CPT

## 2024-05-20 PROCEDURE — 86704 HEP B CORE ANTIBODY TOTAL: CPT

## 2024-05-20 PROCEDURE — 87340 HEPATITIS B SURFACE AG IA: CPT

## 2024-05-20 PROCEDURE — 86200 CCP ANTIBODY: CPT

## 2024-05-20 PROCEDURE — 86376 MICROSOMAL ANTIBODY EACH: CPT

## 2024-05-20 PROCEDURE — 84156 ASSAY OF PROTEIN URINE: CPT

## 2024-05-20 RX ORDER — PREDNISONE 5 MG/1
TABLET ORAL
Qty: 30 TABLET | Refills: 2 | Status: SHIPPED | OUTPATIENT
Start: 2024-05-20

## 2024-05-20 NOTE — PROGRESS NOTES
This is a Rheumatology Consult seen at the request of Dr. Henning      HPI: Steph Callahan is a 37 y/o female who presents for further evaluation + MINA. She has past medical history anemia, seasonal allergies    Chronic arthralgias > 1 year duration    B/l knees and hands    She has noted swelling in her hands    More than 2 hrs am stiffness    Denies rashes, Raynaud's, dry eyes, renal failure, pleural-pericardial effusion, seizures or strokes, thrombotic events or pregnancy morbidity    + dry mouth    As part of w/u MINA positive    ESR, CRP, ESR, dsDNA, SSA/SSB, HLA B 27 negative          --------------------------------------------------------------------------------------------------------        ROS:      - for: Fevers, Chills or sweats.  No HAs or scalp tenderness.  No jaw claudication.  No acute visual or eye changes.  No dry eyes.  No auditory complaints.  No oral lesions or ulcers.  No dry mouth.  No sore throat or cough.  No chest pains or palpitations.  No shortness of breath, dyspnea on exertion or wheezing.  No hemotpysis.  No abdominal pain, GERD symptoms, diarrhea or constipation.  No urinary complaints.  No numbness, tingling or weakness.  No rashes.    All other ROS was reviewed and negative except as above         --------------------------------------------------------------------------------------------------------    Past Medical History    Past Medical History:   Diagnosis Date    Anemia     Canker sores oral     Chickenpox     Pap smear for cervical cancer screening     -wnl per pt; 2022-wnl, HRHPV neg    Seasonal allergies            Past Surgical History    Past Surgical History:   Procedure Laterality Date     SECTION  2021     shortly after birth--elective C/S at 25 weks due to multiple medical problems with fetus.    [facility]    Procedure:  SECTION () REPEAT;  Surgeon: Mandy Weber MD;  Location: [facility];   "Service: Obstetrics    [place] TOOTH EXTRACTION      4/4           Family History    Family History   Problem Relation Age of Onset    Hypertension Mother     Hyperlipidemia Mother     Heart disease Father         CABG    Supraventricular tachycardia Father         required ablation    No Known Problems Sister     No Known Problems Brother     Hypertension Maternal Grandmother     Deafness Maternal Grandmother         elderly--age 96    Dementia Maternal Grandmother     Hypertension Maternal Grandfather     Prostate cancer Maternal Grandfather     Hypertension Paternal Grandmother     Hyperlipidemia Paternal Grandmother     Diabetes type II Paternal Grandmother     Heart attack Paternal Grandfather     Stroke Paternal Grandfather         with paralysis    Breast cancer Neg Hx     Ovarian cancer Neg Hx     Colon cancer Neg Hx             Social History    Social History     Tobacco Use    Smoking status: Some Days     Types: Pipe    Smokeless tobacco: Current    Tobacco comments:     Hookah    Vaping Use    Vaping status: Never Used   Substance Use Topics    Alcohol use: Not Currently     Comment: socially    Drug use: Never     She is      Allergies    No Known Allergies      Medications    Current Outpatient Medications   Medication Instructions    ergocalciferol (VITAMIN D2) 50,000 Units, Oral, Weekly    Prenatal Vit-Fe Fumarate-FA (Prenatal Plus Vitamin/Mineral) 27-1 MG TABS 1 tablet, Oral, Daily          Physical Exam    /78   Pulse 75   Ht 5' 6\" (1.676 m)   Wt 76.2 kg (168 lb)   LMP 04/28/2024   SpO2 100%   BMI 27.12 kg/m²     GEN: AAO, No apparent distress.  Patient is well developed.  HEENT:  Pupils are equal, round and reactive.  Sclera are clear.  Fundoscopic exam is normal.  External ears are without lesions.  Oral pharynx is clear of ulcers or other lesions.  MMM.   NECK:  Supple.  There is no adenopathy appreciable in anterior or posterior cervical chains or supraclavicularly.  JVP is " normal.    HEART: Regular rate and rhythm.  There is no appreciable murmur, gallop or rub.  LUNGS: Clear to auscultation.  ABD:  Soft, without tenderness, rebound or guarding.  No appreciable organomegally.  NEURO: Speech and cognition are normal.  Strength is 5/5 throughout.  Tone is normal.  DTRs are 2/4 at the knees, ankles and elbows.  Gait is normal.  SKIN: There are no rashes or lesions    MUSCULOSKELETAL:     B/l wrists tender      ________________________________________________________________________          Results Review    Component      Latest Ref Rng 3/19/2024   SL AMB MINA TITER      titer 1:320 (H)    SL AMB MINA PATTERN Nuclear, Dense Fine Speckled !    Sjogren's Antibody (SS-A)      <1.0 NEG AI <1.0 NEG    Sjogren's Antibody (SS-B)      <1.0 NEG AI <1.0 NEG    ANTI DNA DOUBLE STRANDED      IU/mL 1    RHEUMATOID FACTOR      <14 IU/mL <14    C-Reactive Protein      <8.0 mg/L 0.9       Legend:  (H) High  ! Abnormal    Component      Latest Ref Rng 3/19/2024   HLA-B27      NEGATIVE  NEGATIVE            Impressions and Plans:    Steph Callahan is a 39 y/o male with pain,swelling and stiffness > 1 year duration    Hands,wrists and knees affected    Reviewed labs and x-ray films    As part of w/u MINA positive    ESR, CRP, ESR, dsDNA, SSA/SSB, HLA B 27 negative    Check RF, CCP, complete MINA profile    Start her on prednisone taper    RTC 4 weeks              Thank you for involving me in this patient's care.        Carlos Guadalupe MD  [facility]

## 2024-05-21 LAB
CCP AB SER IA-ACNC: 1
HBV CORE AB SER QL: NORMAL
HBV CORE IGM SER QL: NORMAL
HBV SURFACE AG SER QL: NORMAL
HCV AB SER QL: NORMAL
THYROGLOB AB SERPL-ACNC: <1 IU/ML (ref 0–0.9)
THYROPEROXIDASE AB SERPL-ACNC: 13 IU/ML (ref 0–34)

## 2024-05-22 DIAGNOSIS — Z31.9 PATIENT DESIRES PREGNANCY: ICD-10-CM

## 2024-05-22 LAB
ENA RNP AB SER-ACNC: <0.2 AI (ref 0–0.9)
ENA SCL70 AB SER-ACNC: <0.2 AI (ref 0–0.9)
ENA SM AB SER-ACNC: <0.2 AI (ref 0–0.9)

## 2024-05-22 RX ORDER — VITAMIN A, VITAMIN C, VITAMIN D, VITAMIN E, THIAMINE, RIBOFLAVIN, NIACIN, VITAMIN B6, FOLIC ACID, VITAMIN B12, CALCIUM, IRON, ZINC, COPPER 4000; 120; 400; 22; 1.84; 3; 20; 10; 1; 12; 200; 27; 25; 2 [IU]/1; MG/1; [IU]/1; [IU]/1; MG/1; MG/1; MG/1; MG/1; MG/1; UG/1; MG/1; MG/1; MG/1; MG/1
1 TABLET ORAL EVERY MORNING
Qty: 90 TABLET | Refills: 4 | Status: SHIPPED | OUTPATIENT
Start: 2024-05-22

## 2024-05-24 ENCOUNTER — HOSPITAL ENCOUNTER (OUTPATIENT)
Dept: INFUSION CENTER | Facility: CLINIC | Age: 39
End: 2024-05-24
Payer: MEDICARE

## 2024-05-24 VITALS
SYSTOLIC BLOOD PRESSURE: 113 MMHG | HEART RATE: 80 BPM | RESPIRATION RATE: 16 BRPM | DIASTOLIC BLOOD PRESSURE: 71 MMHG | TEMPERATURE: 97.9 F

## 2024-05-24 DIAGNOSIS — R79.0 LOW FERRITIN LEVEL: Primary | ICD-10-CM

## 2024-05-24 PROCEDURE — 96374 THER/PROPH/DIAG INJ IV PUSH: CPT

## 2024-05-24 RX ORDER — SODIUM CHLORIDE 9 MG/ML
20 INJECTION, SOLUTION INTRAVENOUS ONCE
Status: CANCELLED | OUTPATIENT
Start: 2024-05-31

## 2024-05-24 RX ORDER — SODIUM CHLORIDE 9 MG/ML
20 INJECTION, SOLUTION INTRAVENOUS ONCE
Status: COMPLETED | OUTPATIENT
Start: 2024-05-24 | End: 2024-05-24

## 2024-05-24 RX ADMIN — IRON SUCROSE 200 MG: 20 INJECTION, SOLUTION INTRAVENOUS at 15:42

## 2024-05-24 RX ADMIN — SODIUM CHLORIDE 20 ML/HR: 0.9 INJECTION, SOLUTION INTRAVENOUS at 15:39

## 2024-05-31 ENCOUNTER — HOSPITAL ENCOUNTER (OUTPATIENT)
Dept: INFUSION CENTER | Facility: CLINIC | Age: 39
End: 2024-05-31
Payer: MEDICARE

## 2024-05-31 VITALS
TEMPERATURE: 97.4 F | DIASTOLIC BLOOD PRESSURE: 60 MMHG | RESPIRATION RATE: 18 BRPM | HEART RATE: 80 BPM | SYSTOLIC BLOOD PRESSURE: 103 MMHG

## 2024-05-31 DIAGNOSIS — R79.0 LOW FERRITIN LEVEL: Primary | ICD-10-CM

## 2024-05-31 PROCEDURE — 96374 THER/PROPH/DIAG INJ IV PUSH: CPT

## 2024-05-31 RX ORDER — SODIUM CHLORIDE 9 MG/ML
20 INJECTION, SOLUTION INTRAVENOUS ONCE
Status: CANCELLED | OUTPATIENT
Start: 2024-05-31

## 2024-05-31 RX ORDER — SODIUM CHLORIDE 9 MG/ML
20 INJECTION, SOLUTION INTRAVENOUS ONCE
Status: COMPLETED | OUTPATIENT
Start: 2024-05-31 | End: 2024-05-31

## 2024-05-31 RX ADMIN — SODIUM CHLORIDE 20 ML/HR: 0.9 INJECTION, SOLUTION INTRAVENOUS at 15:37

## 2024-05-31 RX ADMIN — IRON SUCROSE 200 MG: 20 INJECTION, SOLUTION INTRAVENOUS at 15:43

## 2024-05-31 NOTE — PROGRESS NOTES
Pt tolerated venofer push without incident. Pt declined AVS, this was her last ordered Venofer infusion

## 2024-06-06 DIAGNOSIS — E55.9 VITAMIN D DEFICIENCY: ICD-10-CM

## 2024-06-06 RX ORDER — ERGOCALCIFEROL 1.25 MG/1
CAPSULE ORAL
Qty: 12 CAPSULE | Refills: 3 | Status: SHIPPED | OUTPATIENT
Start: 2024-06-06

## 2024-06-26 ENCOUNTER — OFFICE VISIT (OUTPATIENT)
Dept: RHEUMATOLOGY | Facility: CLINIC | Age: 39
End: 2024-06-26
Payer: MEDICARE

## 2024-06-26 VITALS
SYSTOLIC BLOOD PRESSURE: 116 MMHG | DIASTOLIC BLOOD PRESSURE: 68 MMHG | WEIGHT: 169 LBS | HEIGHT: 66 IN | BODY MASS INDEX: 27.16 KG/M2

## 2024-06-26 DIAGNOSIS — M19.90 INFLAMMATORY ARTHRITIS: Primary | ICD-10-CM

## 2024-06-26 PROCEDURE — 99214 OFFICE O/P EST MOD 30 MIN: CPT | Performed by: INTERNAL MEDICINE

## 2024-06-26 RX ORDER — HYDROXYCHLOROQUINE SULFATE 200 MG/1
200 TABLET, FILM COATED ORAL
Qty: 90 TABLET | Refills: 3 | Status: SHIPPED | OUTPATIENT
Start: 2024-06-26 | End: 2025-06-26

## 2024-06-26 RX ORDER — PREDNISONE 5 MG/1
TABLET ORAL
Qty: 50 TABLET | Refills: 3 | Status: SHIPPED | OUTPATIENT
Start: 2024-06-26

## 2024-06-26 NOTE — PROGRESS NOTES
HPI: Steph Callahan is a 39 y/o female who presents for further f/u inflammatory arthritis. She has past medical history anemia, seasonal allergies    Since last visit she had excellent response on prednisone taper. However she has recurrence wrists, small joints hands, knees, toes    More than 2 hrs am stiffness    Denies rashes, Raynaud's, dry eyes, renal failure, pleural-pericardial effusion, seizures or strokes, thrombotic events or pregnancy morbidity    + dry mouth    As part of w/u MINA positive    ESR, CRP, ESR, dsDNA, SSA/SSB, HLA B 27 negative          --------------------------------------------------------------------------------------------------------        ROS:      - for: Fevers, Chills or sweats.  No HAs or scalp tenderness.  No jaw claudication.  No acute visual or eye changes.  No dry eyes.  No auditory complaints.  No oral lesions or ulcers.  No dry mouth.  No sore throat or cough.  No chest pains or palpitations.  No shortness of breath, dyspnea on exertion or wheezing.  No hemotpysis.  No abdominal pain, GERD symptoms, diarrhea or constipation.  No urinary complaints.  No numbness, tingling or weakness.  No rashes.    All other ROS was reviewed and negative except as above         --------------------------------------------------------------------------------------------------------    Past Medical History    Past Medical History:   Diagnosis Date    Anemia     Canker sores oral     Chickenpox     Pap smear for cervical cancer screening     -wnl per pt; 2022-wnl, HRHPV neg    Seasonal allergies            Past Surgical History    Past Surgical History:   Procedure Laterality Date     SECTION  2021     shortly after birth--elective C/S at 25 weks due to multiple medical problems with fetus.    [facility]  DELIVERY ONLY N/A 2023    Procedure:  SECTION () REPEAT;  Surgeon: Mnady Weber MD;  Location: [facility];  Service: Obstetrics    [place]  "TOOTH EXTRACTION      4/           Family History    Family History   Problem Relation Age of Onset    Hypertension Mother     Hyperlipidemia Mother     Heart disease Father         CABG    Supraventricular tachycardia Father         required ablation    No Known Problems Sister     No Known Problems Brother     Hypertension Maternal Grandmother     Deafness Maternal Grandmother         elderly--age 96    Dementia Maternal Grandmother     Hypertension Maternal Grandfather     Prostate cancer Maternal Grandfather     Hypertension Paternal Grandmother     Hyperlipidemia Paternal Grandmother     Diabetes type II Paternal Grandmother     Heart attack Paternal Grandfather     Stroke Paternal Grandfather         with paralysis    Breast cancer Neg Hx     Ovarian cancer Neg Hx     Colon cancer Neg Hx             Social History    Social History     Tobacco Use    Smoking status: Some Days     Types: Pipe    Smokeless tobacco: Current    Tobacco comments:     Hookah    Vaping Use    Vaping status: Never Used   Substance Use Topics    Alcohol use: Not Currently     Comment: socially    Drug use: Never     She is      Allergies    No Known Allergies      Medications    Current Outpatient Medications   Medication Instructions    ergocalciferol (VITAMIN D2) 50,000 units TAKE 1 CAPSULE BY MOUTH ONE TIME PER WEEK    predniSONE 5 mg tablet Take 4 daily X 3 days, then 3 days for 3 days then 2 daily for 3 days then 1 daily for 3 days    Prenatal Vit-Fe Fumarate-FA (M- Plus) 27-1 MG TABS 1 tablet, Oral, Every morning          Physical Exam    /68   Ht 5' 6\" (1.676 m)   Wt 76.7 kg (169 lb)   BMI 27.28 kg/m²     GEN: AAO, No apparent distress.  Patient is well developed.  HEENT:  Pupils are equal, round and reactive.  Sclera are clear.  Fundoscopic exam is normal.  External ears are without lesions.  Oral pharynx is clear of ulcers or other lesions.  MMM.   NECK:  Supple.  There is no adenopathy appreciable in " anterior or posterior cervical chains or supraclavicularly.  JVP is normal.    HEART: Regular rate and rhythm.  There is no appreciable murmur, gallop or rub.  LUNGS: Clear to auscultation.  ABD:  Soft, without tenderness, rebound or guarding.  No appreciable organomegally.  NEURO: Speech and cognition are normal.  Strength is 5/5 throughout.  Tone is normal.  DTRs are 2/4 at the knees, ankles and elbows.  Gait is normal.  SKIN: There are no rashes or lesions    MUSCULOSKELETAL:     B/l wrists tender      ________________________________________________________________________          Results Review    Component      Latest Ref Rng 3/19/2024   SL AMB MINA TITER      titer 1:320 (H)    SL AMB MINA PATTERN Nuclear, Dense Fine Speckled !    Sjogren's Antibody (SS-A)      <1.0 NEG AI <1.0 NEG    Sjogren's Antibody (SS-B)      <1.0 NEG AI <1.0 NEG    ANTI DNA DOUBLE STRANDED      IU/mL 1    RHEUMATOID FACTOR      <14 IU/mL <14    C-Reactive Protein      <8.0 mg/L 0.9       Legend:  (H) High  ! Abnormal    Component      Latest Ref Rng 3/19/2024   HLA-B27      NEGATIVE  NEGATIVE        Component      Latest Ref Rng 3/19/2024 5/20/2024   RHEUMATOID FACTOR      <14 IU/mL <14     CYCLIC CITRULLINATED PEPTIDE ANTIBODY      See comment   1.0            Impressions and Plans:    Inflammatory arthritis: Reviewed labs     As part of w/u MINA positive    ESR, CRP, ESR, dsDNA, SSA/SSB, HLA B 27 negative    RF and CCP normal    Hands,wrists and knees affected    Resume prednisone taper    Add  (she is contemplating pregnancy and would avoid MTX/Arava)    RTC 3 months              Thank you for involving me in this patient's care.        Carlos Guadalupe MD  [facility] Rheumatology

## 2024-07-03 ENCOUNTER — OFFICE VISIT (OUTPATIENT)
Dept: FAMILY MEDICINE CLINIC | Facility: CLINIC | Age: 39
End: 2024-07-03
Payer: MEDICARE

## 2024-07-03 VITALS
HEART RATE: 68 BPM | TEMPERATURE: 97.5 F | SYSTOLIC BLOOD PRESSURE: 112 MMHG | WEIGHT: 169 LBS | HEIGHT: 66 IN | OXYGEN SATURATION: 99 % | RESPIRATION RATE: 14 BRPM | BODY MASS INDEX: 27.16 KG/M2 | DIASTOLIC BLOOD PRESSURE: 64 MMHG

## 2024-07-03 DIAGNOSIS — E55.9 VITAMIN D DEFICIENCY: ICD-10-CM

## 2024-07-03 DIAGNOSIS — R79.0 LOW FERRITIN: ICD-10-CM

## 2024-07-03 DIAGNOSIS — E04.1 THYROID NODULE: ICD-10-CM

## 2024-07-03 DIAGNOSIS — H91.8X2 OTHER SPECIFIED HEARING LOSS OF LEFT EAR, UNSPECIFIED HEARING STATUS ON CONTRALATERAL SIDE: ICD-10-CM

## 2024-07-03 DIAGNOSIS — R21 RASH: ICD-10-CM

## 2024-07-03 DIAGNOSIS — H53.8 BLURRED VISION, BILATERAL: Primary | ICD-10-CM

## 2024-07-03 PROCEDURE — 99214 OFFICE O/P EST MOD 30 MIN: CPT | Performed by: INTERNAL MEDICINE

## 2024-07-03 RX ORDER — CLOTRIMAZOLE AND BETAMETHASONE DIPROPIONATE 10; .64 MG/G; MG/G
CREAM TOPICAL 2 TIMES DAILY
Qty: 30 G | Refills: 1 | Status: SHIPPED | OUTPATIENT
Start: 2024-07-03

## 2024-07-03 RX ORDER — ERGOCALCIFEROL 1.25 MG/1
50000 CAPSULE ORAL WEEKLY
Qty: 12 CAPSULE | Refills: 3 | Status: SHIPPED | OUTPATIENT
Start: 2024-07-03

## 2024-07-03 NOTE — PROGRESS NOTES
Ambulatory Visit  Name: Steph Callahan      : 1985      MRN: [id number]  Encounter Provider: Tushar Henning MD  Encounter Date: 7/3/2024   Encounter department: [facility]    Assessment & Plan   1. Blurred vision, bilateral  Comments:  Pt is On Plaquanil, needs vision field tested...  Orders:  -     Ambulatory Referral to Ophthalmology; Future; Expected date: 2024  2. Vitamin D deficiency  -     ergocalciferol (VITAMIN D2) 50,000 units; Take 1 capsule (50,000 Units total) by mouth once a week  3. Other specified hearing loss of left ear, unspecified hearing status on contralateral side  Comments:  cause ?? ENT Consult  Orders:  -     Ambulatory Referral to Otolaryngology; Future  4. Thyroid nodule  Comments:  Yearly  Thyroid  Orders:  -     TSH, 3rd generation; Future; Expected date: 2024  -     T4, free; Future; Expected date: 2024  5. Low ferritin  Comments:  S/P Fe Infusions  RTC in 3mos w Blood work  Orders:  -     Ferritin; Future  -     Iron Panel (Includes Ferritin, Iron Sat%, Iron, and TIBC); Future; Expected date: 2024  6. Rash  -     clotrimazole-betamethasone (LOTRISONE) 1-0.05 % cream; Apply topically 2 (two) times a day Apply left foot  Life style Mod  RTC in 3 mos w Blood work       History of Present Illness     38 Y O lady is here for Regular check up, she has few symptoms, she finished Fe Infusions, med list reviewed...        Review of Systems   Constitutional:  Negative for chills, fatigue and fever.   HENT:  Positive for hearing loss. Negative for congestion, facial swelling, sore throat, trouble swallowing and voice change.    Eyes:  Negative for pain, discharge and visual disturbance.   Respiratory:  Negative for cough, shortness of breath and wheezing.    Cardiovascular:  Negative for chest pain, palpitations and leg swelling.   Gastrointestinal:  Negative for abdominal pain, blood in stool, constipation, diarrhea and  "nausea.   Endocrine: Negative for polydipsia, polyphagia and polyuria.   Genitourinary:  Negative for difficulty urinating, hematuria and urgency.   Musculoskeletal:  Positive for arthralgias and myalgias.   Skin:  Negative for rash.   Neurological:  Negative for dizziness, tremors, weakness and headaches.   Hematological:  Negative for adenopathy. Does not bruise/bleed easily.   Psychiatric/Behavioral:  Negative for dysphoric mood, sleep disturbance and suicidal ideas.        Objective     /64 (BP Location: Left arm, Patient Position: Sitting, Cuff Size: Standard)   Pulse 68   Temp 97.5 °F (36.4 °C) (Tympanic)   Resp 14   Ht 5' 6\" (1.676 m)   Wt 76.7 kg (169 lb)   SpO2 99%   BMI 27.28 kg/m²     Physical Exam  Vitals and nursing note reviewed.   Constitutional:       General: She is not in acute distress.     Appearance: She is well-developed. She is not diaphoretic.   HENT:      Head: Normocephalic and atraumatic.      Right Ear: External ear normal.      Left Ear: External ear normal.      Nose: Nose normal.   Eyes:      General:         Right eye: No discharge.         Left eye: No discharge.      Extraocular Movements: EOM normal.      Conjunctiva/sclera: Conjunctivae normal.      Pupils: Pupils are equal, round, and reactive to light.   Neck:      Thyroid: No thyromegaly.      Trachea: No tracheal deviation.   Cardiovascular:      Rate and Rhythm: Normal rate and regular rhythm.      Heart sounds: Normal heart sounds. No murmur heard.     No friction rub.   Pulmonary:      Effort: Pulmonary effort is normal. No respiratory distress.      Breath sounds: Normal breath sounds. No stridor. No wheezing or rales.   Abdominal:      General: Bowel sounds are normal. There is no distension.      Palpations: Abdomen is soft.      Tenderness: There is no abdominal tenderness. There is no guarding.   Musculoskeletal:         General: Tenderness present. No swelling, deformity or edema. Normal range of motion. "      Cervical back: Normal range of motion and neck supple.   Lymphadenopathy:      Cervical: No cervical adenopathy.   Skin:     General: Skin is warm and dry.      Capillary Refill: Capillary refill takes less than 2 seconds.      Coloration: Skin is not pale.      Findings: No erythema or rash.   Neurological:      Mental Status: She is alert and oriented to person, place, and time.      Cranial Nerves: No cranial nerve deficit.      Coordination: Coordination normal.   Psychiatric:         Mood and Affect: Mood and affect and mood normal.         Behavior: Behavior normal.       Administrative Statements

## 2024-07-06 LAB
FERRITIN SERPL-MCNC: 49 NG/ML (ref 16–154)
IRON SATN MFR SERPL: 36 % (CALC) (ref 16–45)
IRON SERPL-MCNC: 106 MCG/DL (ref 40–190)
T4 FREE SERPL-MCNC: 1 NG/DL (ref 0.8–1.8)
TIBC SERPL-MCNC: 293 MCG/DL (CALC) (ref 250–450)
TSH SERPL-ACNC: 1.3 MIU/L

## 2024-08-07 ENCOUNTER — TELEPHONE (OUTPATIENT)
Dept: GASTROENTEROLOGY | Facility: MEDICAL CENTER | Age: 39
End: 2024-08-07

## 2024-08-08 ENCOUNTER — OFFICE VISIT (OUTPATIENT)
Dept: OBGYN CLINIC | Facility: CLINIC | Age: 39
End: 2024-08-08

## 2024-08-08 VITALS
BODY MASS INDEX: 27.26 KG/M2 | DIASTOLIC BLOOD PRESSURE: 48 MMHG | HEIGHT: 66 IN | WEIGHT: 169.6 LBS | HEART RATE: 82 BPM | RESPIRATION RATE: 18 BRPM | SYSTOLIC BLOOD PRESSURE: 87 MMHG

## 2024-08-08 DIAGNOSIS — N93.9 ABNORMAL UTERINE BLEEDING (AUB): Primary | ICD-10-CM

## 2024-08-08 PROCEDURE — 99212 OFFICE O/P EST SF 10 MIN: CPT | Performed by: OBSTETRICS & GYNECOLOGY

## 2024-08-08 NOTE — PROGRESS NOTES
"Assessment/Plan:     No problem-specific Assessment & Plan notes found for this encounter.          Diagnoses and all orders for this visit:    Abnormal uterine bleeding (AUB)  -     US pelvis complete non OB; Future      Will follow up with results.  Will continue to monitor menses.        Subjective:      Patient ID: Steph Callahan is a 38 y.o. female who presents for recent abnormal bleeding.She bled twice in July 8-13 and July 30- aug 4.  She is using 3-4 pads per day.  She denies pelvic pain.  She is not using birth control and is open to pregnancy in the near future.      She was taking prednisone in June for 12 and then 20 days.  She is taking plaquenil on a daily basis.  Suspect medication side effect as she is otherwise asymptomatic and has no other complaints.    We discussed getting a pelvic US to rule out structural abnormality for which she is interested.    HPI    The following portions of the patient's history were reviewed and updated as appropriate: allergies, current medications, past family history, past medical history, past social history, past surgical history and problem list.    Review of Systems      Objective:      BP (!) 87/48 (BP Location: Left arm, Patient Position: Sitting, Cuff Size: Large)   Pulse 82   Resp 18   Ht 5' 6\" (1.676 m)   Wt 76.9 kg (169 lb 9.6 oz)   LMP 07/13/2024 (Exact Date)   BMI 27.37 kg/m²          Physical Exam  Vitals and nursing note reviewed.   Constitutional:       Appearance: Normal appearance.   Pulmonary:      Effort: Pulmonary effort is normal.   Neurological:      General: No focal deficit present.      Mental Status: She is alert and oriented to person, place, and time.   Psychiatric:         Mood and Affect: Mood normal.         Behavior: Behavior normal.           "

## 2024-08-14 ENCOUNTER — HOSPITAL ENCOUNTER (OUTPATIENT)
Dept: ULTRASOUND IMAGING | Facility: HOSPITAL | Age: 39
Discharge: HOME | End: 2024-08-14
Attending: OBSTETRICS & GYNECOLOGY
Payer: MEDICARE

## 2024-08-14 DIAGNOSIS — N93.9 ABNORMAL UTERINE BLEEDING (AUB): ICD-10-CM

## 2024-08-14 PROCEDURE — 76830 TRANSVAGINAL US NON-OB: CPT

## 2024-08-14 PROCEDURE — 76856 US EXAM PELVIC COMPLETE: CPT

## 2024-08-19 ENCOUNTER — TELEPHONE (OUTPATIENT)
Dept: OBGYN CLINIC | Facility: CLINIC | Age: 39
End: 2024-08-19

## 2024-08-19 NOTE — TELEPHONE ENCOUNTER
Called pt and informed of results per providers note, pt verbalized understanding.    ----- Message from Snow Hoff MD sent at 8/19/2024  8:51 AM EDT -----  Please inform pelvic US is normal, no specific follow up needed.    Thank you  ----- Message -----  From: Interface, Radiology Results In  Sent: 8/16/2024   5:58 PM EDT  To: Snow Hoff MD

## 2024-08-27 ENCOUNTER — TELEPHONE (OUTPATIENT)
Dept: RHEUMATOLOGY | Facility: CLINIC | Age: 39
End: 2024-08-27

## 2024-08-27 NOTE — TELEPHONE ENCOUNTER
- Mychart message sent on 8/22 asking for pt to call office so we can reschedule their appt      - LM on 8/27 asking for pt to call office so we can reschedule their appt     - LM on 8/28 asking for pt to call office so we can reschedule their appt. Pts appt was cancelled and a letter was sent.

## 2024-09-11 ENCOUNTER — OFFICE VISIT (OUTPATIENT)
Dept: RHEUMATOLOGY | Facility: CLINIC | Age: 39
End: 2024-09-11
Payer: MEDICARE

## 2024-09-11 VITALS
BODY MASS INDEX: 27 KG/M2 | WEIGHT: 168 LBS | DIASTOLIC BLOOD PRESSURE: 64 MMHG | HEIGHT: 66 IN | SYSTOLIC BLOOD PRESSURE: 122 MMHG

## 2024-09-11 DIAGNOSIS — M19.90 INFLAMMATORY ARTHRITIS: Primary | ICD-10-CM

## 2024-09-11 PROCEDURE — 99214 OFFICE O/P EST MOD 30 MIN: CPT | Performed by: INTERNAL MEDICINE

## 2024-09-11 NOTE — PROGRESS NOTES
HPI: Steph Callahan is a 40 y/o female who presents for further f/u seronegative inflammatory arthritis.     Since last visit she had excellent response on prednisone taper. However she has recurrence wrists, small joints hands, knees, toes    More than 2 hrs am stiffness    HCQ has not been helping    She has past medical history anemia, seasonal allergies    Denies rashes, Raynaud's, dry eyes, renal failure, pleural-pericardial effusion, seizures or strokes, thrombotic events or pregnancy morbidity    + dry mouth    As part of w/u MINA positive    ESR, CRP, ESR, dsDNA, SSA/SSB, HLA B 27 negative          --------------------------------------------------------------------------------------------------------        ROS:      - for: Fevers, Chills or sweats.  No HAs or scalp tenderness.  No jaw claudication.  No acute visual or eye changes.  No dry eyes.  No auditory complaints.  No oral lesions or ulcers.  No dry mouth.  No sore throat or cough.  No chest pains or palpitations.  No shortness of breath, dyspnea on exertion or wheezing.  No hemotpysis.  No abdominal pain, GERD symptoms, diarrhea or constipation.  No urinary complaints.  No numbness, tingling or weakness.  No rashes.    All other ROS was reviewed and negative except as above         --------------------------------------------------------------------------------------------------------    Past Medical History    Past Medical History:   Diagnosis Date    Anemia     Canker sores oral     Chickenpox     Pap smear for cervical cancer screening     -wnl per pt; 2022-wnl, HRHPV neg    Seasonal allergies            Past Surgical History    Past Surgical History:   Procedure Laterality Date     SECTION  2021     shortly after birth--elective C/S at 25 weks due to multiple medical problems with fetus.    SC  DELIVERY ONLY N/A 2023    Procedure:  SECTION () REPEAT;  Surgeon: Mandy Weber MD;   "Location: [facility];  Service: Obstetrics    WISDOM TOOTH EXTRACTION                 Family History    Family History   Problem Relation Age of Onset    Hypertension Mother     Hyperlipidemia Mother     Heart disease Father         CABG    Supraventricular tachycardia Father         required ablation    No Known Problems Sister     No Known Problems Brother     Hypertension Maternal Grandmother     Deafness Maternal Grandmother         elderly--age 96    Dementia Maternal Grandmother     Hypertension Maternal Grandfather     Prostate cancer Maternal Grandfather     Hypertension Paternal Grandmother     Hyperlipidemia Paternal Grandmother     Diabetes type II Paternal Grandmother     Heart attack Paternal Grandfather     Stroke Paternal Grandfather         with paralysis    Breast cancer Neg Hx     Ovarian cancer Neg Hx     Colon cancer Neg Hx             Social History    Social History     Tobacco Use    Smoking status: Some Days     Types: Pipe    Smokeless tobacco: Current    Tobacco comments:     Hookah    Vaping Use    Vaping status: Never Used   Substance Use Topics    Alcohol use: Not Currently     Comment: socially    Drug use: Never     She is      Allergies    No Known Allergies      Medications    Current Outpatient Medications   Medication Instructions    clotrimazole-betamethasone (LOTRISONE) 1-0.05 % cream Topical, 2 times daily, Apply left foot    ergocalciferol (VITAMIN D2) 50,000 Units, Oral, Weekly    hydroxychloroquine (PLAQUENIL) 200 mg, Oral, Daily with breakfast    predniSONE 5 mg tablet 4 tabs x5 days, then 3 tabs x5 days, then 2 tabs x5 days, then 1 tab x5 days, then stop.    Prenatal Vit-Fe Fumarate-FA (M-Antolin Plus) 27-1 MG TABS 1 tablet, Oral, Every morning          Physical Exam    /64   Ht 5' 6\" (1.676 m)   Wt 76.2 kg (168 lb)   BMI 27.12 kg/m²     GEN: AAO, No apparent distress.  Patient is well developed.  HEENT:  Pupils are equal, round and reactive.  Sclera are clear. "  Fundoscopic exam is normal.  External ears are without lesions.  Oral pharynx is clear of ulcers or other lesions.  MMM.   NECK:  Supple.  There is no adenopathy appreciable in anterior or posterior cervical chains or supraclavicularly.  JVP is normal.    HEART: Regular rate and rhythm.  There is no appreciable murmur, gallop or rub.  LUNGS: Clear to auscultation.  ABD:  Soft, without tenderness, rebound or guarding.  No appreciable organomegally.  NEURO: Speech and cognition are normal.  Strength is 5/5 throughout.  Tone is normal.  DTRs are 2/4 at the knees, ankles and elbows.  Gait is normal.  SKIN: There are no rashes or lesions    MUSCULOSKELETAL:     B/l wrists tender      ________________________________________________________________________          Results Review    Component      Latest Ref Rng 3/19/2024   SL AMB MINA TITER      titer 1:320 (H)    SL AMB MINA PATTERN Nuclear, Dense Fine Speckled !    Sjogren's Antibody (SS-A)      <1.0 NEG AI <1.0 NEG    Sjogren's Antibody (SS-B)      <1.0 NEG AI <1.0 NEG    ANTI DNA DOUBLE STRANDED      IU/mL 1    RHEUMATOID FACTOR      <14 IU/mL <14    C-Reactive Protein      <8.0 mg/L 0.9       Legend:  (H) High  ! Abnormal    Component      Latest Ref Rng 3/19/2024   HLA-B27      NEGATIVE  NEGATIVE        Component      Latest Ref Rng 3/19/2024 5/20/2024   RHEUMATOID FACTOR      <14 IU/mL <14     CYCLIC CITRULLINATED PEPTIDE ANTIBODY      See comment   1.0            Impressions and Plans:    Inflammatory arthritis: d/c HCQ per patient request (concern ocular side effects)     As part of w/u MINA positive    ESR, CRP, ESR, dsDNA, SSA/SSB, HLA B 27 negative    RF and CCP normal    Prednisone taper as needed    She is contemplating possible pregnancy     I have provided handouts including MTX/Cimzia    RTC 3 months              Thank you for involving me in this patient's care.        Carlos Guadalupe MD  [facility]

## 2024-10-02 ENCOUNTER — OFFICE VISIT (OUTPATIENT)
Dept: FAMILY MEDICINE CLINIC | Facility: CLINIC | Age: 39
End: 2024-10-02
Payer: MEDICARE

## 2024-10-02 VITALS
OXYGEN SATURATION: 97 % | SYSTOLIC BLOOD PRESSURE: 110 MMHG | TEMPERATURE: 98.2 F | BODY MASS INDEX: 26.36 KG/M2 | WEIGHT: 164 LBS | RESPIRATION RATE: 14 BRPM | DIASTOLIC BLOOD PRESSURE: 76 MMHG | HEART RATE: 76 BPM | HEIGHT: 66 IN

## 2024-10-02 DIAGNOSIS — E55.9 VITAMIN D DEFICIENCY: ICD-10-CM

## 2024-10-02 DIAGNOSIS — R79.0 LOW FERRITIN: ICD-10-CM

## 2024-10-02 DIAGNOSIS — M19.90 INFLAMMATORY ARTHRITIS: ICD-10-CM

## 2024-10-02 DIAGNOSIS — E04.1 THYROID NODULE: Primary | ICD-10-CM

## 2024-10-02 DIAGNOSIS — R31.29 OTHER MICROSCOPIC HEMATURIA: ICD-10-CM

## 2024-10-02 LAB
SL AMB  POCT GLUCOSE, UA: NORMAL
SL AMB LEUKOCYTE ESTERASE,UA: NORMAL
SL AMB POCT BILIRUBIN,UA: NORMAL
SL AMB POCT BLOOD,UA: NORMAL
SL AMB POCT CLARITY,UA: CLEAR
SL AMB POCT COLOR,UA: YELLOW
SL AMB POCT KETONES,UA: NORMAL
SL AMB POCT NITRITE,UA: NORMAL
SL AMB POCT PH,UA: 6
SL AMB POCT SPECIFIC GRAVITY,UA: 1.02
SL AMB POCT URINE PROTEIN: NORMAL
SL AMB POCT UROBILINOGEN: 0.2

## 2024-10-02 PROCEDURE — 81002 URINALYSIS NONAUTO W/O SCOPE: CPT | Performed by: INTERNAL MEDICINE

## 2024-10-02 PROCEDURE — 99214 OFFICE O/P EST MOD 30 MIN: CPT | Performed by: INTERNAL MEDICINE

## 2024-10-02 RX ORDER — ERGOCALCIFEROL 1.25 MG/1
50000 CAPSULE, LIQUID FILLED ORAL WEEKLY
Qty: 12 CAPSULE | Refills: 3 | Status: SHIPPED | OUTPATIENT
Start: 2024-10-02

## 2024-10-02 NOTE — PROGRESS NOTES
Ambulatory Visit  Name: Steph Callahan      : 1985      MRN: [id number]  Encounter Provider: Tushar Henning MD  Encounter Date: 10/2/2024   Encounter department: [facility]    Assessment & Plan  Inflammatory arthritis    Orders:    UA (URINE) with reflex to Scope; Future    Magnesium; Future    Sedimentation rate, automated; Future    Quantiferon TB Gold Plus Assay; Future    C-reactive protein; Future    Vitamin B12; Future    Vitamin D 25 hydroxy; Future    TSH, 3rd generation; Future    T4, free; Future    Thyroid Antibodies Panel; Future    CBC and differential; Future    Comprehensive metabolic panel; Future    Lipid panel; Future    UA (URINE) with reflex to Scope    Magnesium    Sedimentation rate, automated    C-reactive protein    Vitamin B12    Vitamin D 25 hydroxy    TSH, 3rd generation    T4, free    CBC and differential    Comprehensive metabolic panel    Lipid panel    Vitamin D deficiency    Orders:    ergocalciferol (VITAMIN D2) 50,000 units; Take 1 capsule (50,000 Units total) by mouth once a week    UA (URINE) with reflex to Scope; Future    Magnesium; Future    Sedimentation rate, automated; Future    Quantiferon TB Gold Plus Assay; Future    C-reactive protein; Future    Vitamin B12; Future    Vitamin D 25 hydroxy; Future    TSH, 3rd generation; Future    T4, free; Future    Thyroid Antibodies Panel; Future    CBC and differential; Future    Comprehensive metabolic panel; Future    Lipid panel; Future    UA (URINE) with reflex to Scope    Magnesium    Sedimentation rate, automated    C-reactive protein    Vitamin B12    Vitamin D 25 hydroxy    TSH, 3rd generation    T4, free    CBC and differential    Comprehensive metabolic panel    Lipid panel    Thyroid nodule    Orders:    UA (URINE) with reflex to Scope; Future    Magnesium; Future    Sedimentation rate, automated; Future    Quantiferon TB Gold Plus Assay; Future    C-reactive protein; Future     Vitamin B12; Future    Vitamin D 25 hydroxy; Future    TSH, 3rd generation; Future    T4, free; Future    Thyroid Antibodies Panel; Future    CBC and differential; Future    Comprehensive metabolic panel; Future    Lipid panel; Future    UA (URINE) with reflex to Scope    Magnesium    Sedimentation rate, automated    C-reactive protein    Vitamin B12    Vitamin D 25 hydroxy    TSH, 3rd generation    T4, free    CBC and differential    Comprehensive metabolic panel    Lipid panel    Low ferritin    Orders:    UA (URINE) with reflex to Scope; Future    Magnesium; Future    Sedimentation rate, automated; Future    Quantiferon TB Gold Plus Assay; Future    C-reactive protein; Future    Vitamin B12; Future    Vitamin D 25 hydroxy; Future    TSH, 3rd generation; Future    T4, free; Future    Thyroid Antibodies Panel; Future    CBC and differential; Future    Comprehensive metabolic panel; Future    Lipid panel; Future    UA (URINE) with reflex to Scope    Magnesium    Sedimentation rate, automated    C-reactive protein    Vitamin B12    Vitamin D 25 hydroxy    TSH, 3rd generation    T4, free    CBC and differential    Comprehensive metabolic panel    Lipid panel    Other microscopic hematuria    Orders:    POCT urine dip    Life style mod  FU w Rheumatology  RTC in 2-3 mos w Blood work       History of Present Illness     39 Y O lady is here for Regular Check up, she feels Fine, recent Blood work and med list reviewed,...          Review of Systems   Constitutional:  Negative for chills, fatigue and fever.   HENT:  Positive for postnasal drip. Negative for congestion, facial swelling, sore throat, trouble swallowing and voice change.    Eyes:  Negative for pain, discharge and visual disturbance.   Respiratory:  Negative for cough, shortness of breath and wheezing.    Cardiovascular:  Negative for chest pain, palpitations and leg swelling.   Gastrointestinal:  Negative for abdominal pain, blood in stool, constipation,  "diarrhea and nausea.   Endocrine: Negative for polydipsia, polyphagia and polyuria.   Genitourinary:  Negative for difficulty urinating, hematuria and urgency.   Musculoskeletal:  Positive for arthralgias and myalgias.   Skin:  Negative for rash.   Neurological:  Negative for dizziness, tremors, weakness and headaches.   Hematological:  Negative for adenopathy. Does not bruise/bleed easily.   Psychiatric/Behavioral:  Negative for dysphoric mood, sleep disturbance and suicidal ideas.            Objective     /76 (BP Location: Left arm, Patient Position: Sitting, Cuff Size: Standard)   Pulse 76   Temp 98.2 °F (36.8 °C) (Tympanic)   Resp 14   Ht 5' 6\" (1.676 m)   Wt 74.4 kg (164 lb)   SpO2 97%   BMI 26.47 kg/m²     Physical Exam  Constitutional:       General: She is not in acute distress.     Appearance: She is well-developed. She is not diaphoretic.   HENT:      Head: Normocephalic.      Right Ear: External ear normal.      Left Ear: External ear normal.      Nose: Nose normal.   Eyes:      General:         Right eye: No discharge.         Left eye: No discharge.      Extraocular Movements: EOM normal.      Conjunctiva/sclera: Conjunctivae normal.      Pupils: Pupils are equal, round, and reactive to light.   Neck:      Thyroid: No thyromegaly.      Trachea: No tracheal deviation.   Cardiovascular:      Rate and Rhythm: Normal rate and regular rhythm.      Heart sounds: Normal heart sounds. No murmur heard.     No friction rub.   Pulmonary:      Effort: Pulmonary effort is normal. No respiratory distress.      Breath sounds: Normal breath sounds. No stridor. No wheezing or rales.   Abdominal:      General: Bowel sounds are normal. There is no distension.      Palpations: Abdomen is soft.      Tenderness: There is no abdominal tenderness. There is no guarding.   Musculoskeletal:         General: Tenderness present. No deformity or edema. Normal range of motion.      Cervical back: Normal range of motion " and neck supple.   Lymphadenopathy:      Cervical: No cervical adenopathy.   Skin:     General: Skin is warm.      Coloration: Skin is not pale.      Findings: No erythema or rash.   Neurological:      Mental Status: She is alert and oriented to person, place, and time.      Cranial Nerves: No cranial nerve deficit.      Coordination: Coordination normal.   Psychiatric:         Mood and Affect: Mood and affect normal.         Behavior: Behavior normal.

## 2024-10-04 ENCOUNTER — TELEPHONE (OUTPATIENT)
Age: 39
End: 2024-10-04

## 2024-10-04 NOTE — TELEPHONE ENCOUNTER
Patient calling stating she would like to proceed with the injection offered to her in the office. She does not remember the name and I do not see it in the notes. Please advise.

## 2024-10-07 ENCOUNTER — APPOINTMENT (OUTPATIENT)
Dept: LAB | Facility: CLINIC | Age: 39
End: 2024-10-07
Payer: MEDICARE

## 2024-10-07 DIAGNOSIS — E78.49 OTHER HYPERLIPIDEMIA: ICD-10-CM

## 2024-10-07 DIAGNOSIS — R76.8 POSITIVE ANA (ANTINUCLEAR ANTIBODY): ICD-10-CM

## 2024-10-07 DIAGNOSIS — Z11.59 NEED FOR HEPATITIS C SCREENING TEST: ICD-10-CM

## 2024-10-07 DIAGNOSIS — R53.83 OTHER FATIGUE: ICD-10-CM

## 2024-10-07 DIAGNOSIS — E55.9 VITAMIN D DEFICIENCY: ICD-10-CM

## 2024-10-07 DIAGNOSIS — M19.90 ARTHRITIS: ICD-10-CM

## 2024-10-07 DIAGNOSIS — E04.1 THYROID NODULE: ICD-10-CM

## 2024-10-07 DIAGNOSIS — R79.0 LOW FERRITIN: ICD-10-CM

## 2024-10-07 DIAGNOSIS — M19.90 INFLAMMATORY ARTHRITIS: ICD-10-CM

## 2024-10-07 DIAGNOSIS — K21.9 GASTROESOPHAGEAL REFLUX DISEASE WITHOUT ESOPHAGITIS: ICD-10-CM

## 2024-10-07 DIAGNOSIS — Z00.01 ENCOUNTER FOR GENERAL ADULT MEDICAL EXAMINATION WITH ABNORMAL FINDINGS: ICD-10-CM

## 2024-10-07 DIAGNOSIS — J32.8 OTHER CHRONIC SINUSITIS: ICD-10-CM

## 2024-10-07 LAB
25(OH)D3 SERPL-MCNC: 42.3 NG/ML (ref 30–100)
ALBUMIN SERPL BCG-MCNC: 4.3 G/DL (ref 3.5–5)
ALP SERPL-CCNC: 39 U/L (ref 34–104)
ALT SERPL W P-5'-P-CCNC: 11 U/L (ref 7–52)
ANA SER QL IA: NEGATIVE
ANION GAP SERPL CALCULATED.3IONS-SCNC: 9 MMOL/L (ref 4–13)
AST SERPL W P-5'-P-CCNC: 12 U/L (ref 13–39)
BASOPHILS # BLD AUTO: 0.07 THOUSANDS/ΜL (ref 0–0.1)
BASOPHILS NFR BLD AUTO: 1 % (ref 0–1)
BILIRUB SERPL-MCNC: 0.56 MG/DL (ref 0.2–1)
BILIRUB UR QL STRIP: NEGATIVE
BUN SERPL-MCNC: 10 MG/DL (ref 5–25)
CALCIUM SERPL-MCNC: 8.7 MG/DL (ref 8.4–10.2)
CHLORIDE SERPL-SCNC: 106 MMOL/L (ref 96–108)
CHOLEST SERPL-MCNC: 179 MG/DL
CLARITY UR: CLEAR
CO2 SERPL-SCNC: 24 MMOL/L (ref 21–32)
COLOR UR: COLORLESS
CREAT SERPL-MCNC: 0.57 MG/DL (ref 0.6–1.3)
CRP SERPL QL: <1 MG/L
EOSINOPHIL # BLD AUTO: 0.24 THOUSAND/ΜL (ref 0–0.61)
EOSINOPHIL NFR BLD AUTO: 3 % (ref 0–6)
ERYTHROCYTE [DISTWIDTH] IN BLOOD BY AUTOMATED COUNT: 12.8 % (ref 11.6–15.1)
ERYTHROCYTE [SEDIMENTATION RATE] IN BLOOD: 15 MM/HOUR (ref 0–19)
FERRITIN SERPL-MCNC: 30 NG/ML (ref 11–307)
GFR SERPL CREATININE-BSD FRML MDRD: 117 ML/MIN/1.73SQ M
GLUCOSE P FAST SERPL-MCNC: 89 MG/DL (ref 65–99)
GLUCOSE UR STRIP-MCNC: NEGATIVE MG/DL
HCT VFR BLD AUTO: 41.2 % (ref 34.8–46.1)
HCV AB SER QL: NORMAL
HDLC SERPL-MCNC: 50 MG/DL
HGB BLD-MCNC: 13.6 G/DL (ref 11.5–15.4)
HGB UR QL STRIP.AUTO: NEGATIVE
IMM GRANULOCYTES # BLD AUTO: 0.02 THOUSAND/UL (ref 0–0.2)
IMM GRANULOCYTES NFR BLD AUTO: 0 % (ref 0–2)
IRON SATN MFR SERPL: 34 % (ref 15–50)
IRON SERPL-MCNC: 105 UG/DL (ref 50–212)
KETONES UR STRIP-MCNC: NEGATIVE MG/DL
LDLC SERPL CALC-MCNC: 113 MG/DL (ref 0–100)
LEUKOCYTE ESTERASE UR QL STRIP: NEGATIVE
LYMPHOCYTES # BLD AUTO: 2.35 THOUSANDS/ΜL (ref 0.6–4.47)
LYMPHOCYTES NFR BLD AUTO: 30 % (ref 14–44)
MAGNESIUM SERPL-MCNC: 2.1 MG/DL (ref 1.9–2.7)
MCH RBC QN AUTO: 29.8 PG (ref 26.8–34.3)
MCHC RBC AUTO-ENTMCNC: 33 G/DL (ref 31.4–37.4)
MCV RBC AUTO: 90 FL (ref 82–98)
MONOCYTES # BLD AUTO: 0.5 THOUSAND/ΜL (ref 0.17–1.22)
MONOCYTES NFR BLD AUTO: 6 % (ref 4–12)
NEUTROPHILS # BLD AUTO: 4.63 THOUSANDS/ΜL (ref 1.85–7.62)
NEUTS SEG NFR BLD AUTO: 60 % (ref 43–75)
NITRITE UR QL STRIP: NEGATIVE
NONHDLC SERPL-MCNC: 129 MG/DL
NRBC BLD AUTO-RTO: 0 /100 WBCS
PH UR STRIP.AUTO: 7 [PH]
PLATELET # BLD AUTO: 203 THOUSANDS/UL (ref 149–390)
PMV BLD AUTO: 11 FL (ref 8.9–12.7)
POTASSIUM SERPL-SCNC: 3.8 MMOL/L (ref 3.5–5.3)
PROT SERPL-MCNC: 6.8 G/DL (ref 6.4–8.4)
PROT UR STRIP-MCNC: NEGATIVE MG/DL
RBC # BLD AUTO: 4.56 MILLION/UL (ref 3.81–5.12)
SODIUM SERPL-SCNC: 139 MMOL/L (ref 135–147)
SP GR UR STRIP.AUTO: 1.01 (ref 1–1.03)
T4 FREE SERPL-MCNC: 0.76 NG/DL (ref 0.61–1.12)
TIBC SERPL-MCNC: 306 UG/DL (ref 250–450)
TRIGL SERPL-MCNC: 80 MG/DL
TSH SERPL DL<=0.05 MIU/L-ACNC: 1.52 UIU/ML (ref 0.45–4.5)
UIBC SERPL-MCNC: 201 UG/DL (ref 155–355)
UROBILINOGEN UR STRIP-ACNC: <2 MG/DL
VIT B12 SERPL-MCNC: 322 PG/ML (ref 180–914)
WBC # BLD AUTO: 7.81 THOUSAND/UL (ref 4.31–10.16)

## 2024-10-07 PROCEDURE — 83735 ASSAY OF MAGNESIUM: CPT

## 2024-10-07 PROCEDURE — 86480 TB TEST CELL IMMUN MEASURE: CPT

## 2024-10-07 PROCEDURE — 82607 VITAMIN B-12: CPT

## 2024-10-07 PROCEDURE — 86225 DNA ANTIBODY NATIVE: CPT

## 2024-10-07 PROCEDURE — 82728 ASSAY OF FERRITIN: CPT

## 2024-10-07 PROCEDURE — 84443 ASSAY THYROID STIM HORMONE: CPT

## 2024-10-07 PROCEDURE — 82306 VITAMIN D 25 HYDROXY: CPT

## 2024-10-07 PROCEDURE — 86003 ALLG SPEC IGE CRUDE XTRC EA: CPT

## 2024-10-07 PROCEDURE — 84439 ASSAY OF FREE THYROXINE: CPT

## 2024-10-07 PROCEDURE — 86800 THYROGLOBULIN ANTIBODY: CPT

## 2024-10-07 PROCEDURE — 86235 NUCLEAR ANTIGEN ANTIBODY: CPT

## 2024-10-07 PROCEDURE — 85025 COMPLETE CBC W/AUTO DIFF WBC: CPT

## 2024-10-07 PROCEDURE — 83540 ASSAY OF IRON: CPT

## 2024-10-07 PROCEDURE — 86803 HEPATITIS C AB TEST: CPT

## 2024-10-07 PROCEDURE — 80061 LIPID PANEL: CPT

## 2024-10-07 PROCEDURE — 81003 URINALYSIS AUTO W/O SCOPE: CPT

## 2024-10-07 PROCEDURE — 86140 C-REACTIVE PROTEIN: CPT

## 2024-10-07 PROCEDURE — 85652 RBC SED RATE AUTOMATED: CPT

## 2024-10-07 PROCEDURE — 86038 ANTINUCLEAR ANTIBODIES: CPT

## 2024-10-07 PROCEDURE — 80053 COMPREHEN METABOLIC PANEL: CPT

## 2024-10-07 PROCEDURE — 86430 RHEUMATOID FACTOR TEST QUAL: CPT

## 2024-10-07 PROCEDURE — 36415 COLL VENOUS BLD VENIPUNCTURE: CPT

## 2024-10-07 PROCEDURE — 82785 ASSAY OF IGE: CPT

## 2024-10-07 PROCEDURE — 83550 IRON BINDING TEST: CPT

## 2024-10-07 PROCEDURE — 86376 MICROSOMAL ANTIBODY EACH: CPT

## 2024-10-07 PROCEDURE — 86063 ANTISTREPTOLYSIN O SCREEN: CPT

## 2024-10-08 LAB
ALMOND IGE QN: <0.1 KUA/I
ASO AB TITR SER LA: NORMAL {TITER}
CASHEW NUT IGE QN: <0.1 KUA/I
CODFISH IGE QN: <0.1 KUA/I
DSDNA AB SER-ACNC: 1 IU/ML (ref 0–9)
EGG WHITE IGE QN: <0.1 KUA/I
ENA SS-A AB SER-ACNC: <0.2 AI (ref 0–0.9)
ENA SS-B AB SER-ACNC: <0.2 AI (ref 0–0.9)
GAMMA INTERFERON BACKGROUND BLD IA-ACNC: 0.01 IU/ML
GLUTEN IGE QN: <0.1 KUA/I
HAZELNUT IGE QN: <0.1 KUA/L
M TB IFN-G BLD-IMP: NEGATIVE
M TB IFN-G CD4+ BCKGRND COR BLD-ACNC: 0.03 IU/ML
M TB IFN-G CD4+ BCKGRND COR BLD-ACNC: 0.04 IU/ML
MILK IGE QN: <0.1 KUA/I
MITOGEN IGNF BCKGRD COR BLD-ACNC: 9.99 IU/ML
PEANUT IGE QN: <0.1 KUA/I
RHEUMATOID FACT SER QL LA: NEGATIVE
SALMON IGE QN: <0.1 KUA/I
SCALLOP IGE QN: <0.1 KUA/L
SESAME SEED IGE QN: <0.1 KUA/I
SHRIMP IGE QN: <0.1 KUA/L
SOYBEAN IGE QN: <0.1 KUA/I
THYROGLOB AB SERPL-ACNC: <1 IU/ML (ref 0–0.9)
THYROPEROXIDASE AB SERPL-ACNC: <9 IU/ML (ref 0–34)
TOTAL IGE SMQN RAST: 5.9 KU/L (ref 0–113)
TUNA IGE QN: <0.1 KUA/I
WALNUT IGE QN: <0.1 KUA/I
WHEAT IGE QN: <0.1 KUA/I

## 2024-10-09 LAB

## 2024-10-16 LAB — HLA-B27 QL NAA+PROBE: NORMAL

## 2024-10-17 ENCOUNTER — TELEPHONE (OUTPATIENT)
Dept: RHEUMATOLOGY | Facility: CLINIC | Age: 39
End: 2024-10-17

## 2024-10-17 NOTE — TELEPHONE ENCOUNTER
PA for Cimzia SUBMITTED     via    [x][facility]-KEY: [id number]  []Cathleenrigoran-Case ID #    []Availity-Auth ID #  NDC #    []Faxed to plan   []Other website    []Phone call Case ID #      Office notes sent, clinical questions answered. Awaiting determination    Turnaround time for your insurance to make a decision on your Prior Authorization can take 7-21 business days.

## 2024-10-17 NOTE — TELEPHONE ENCOUNTER
Pls prior auth Cimzia 200 mg SC every 2 weeks    Dx rheumatoid arthritis     Failed/intolerant plaquenil

## 2024-10-24 ENCOUNTER — TELEPHONE (OUTPATIENT)
Age: 39
End: 2024-10-24

## 2024-10-24 NOTE — TELEPHONE ENCOUNTER
Last visit 10/02/2024  Next appt 11/04/2024    Patient stated that she tried to make an appt with ENT, however they didn't have anything until May 2025. Patient stated she mentioned it to her PCP and the doctor mentioned that he would try to help her acquire an earlier appt. Patient is following up on that conversation. Please contact patient. Please advise.

## 2024-10-24 NOTE — TELEPHONE ENCOUNTER
D/w patient    Please Prior Auth Humira 40 mg SC every 2 weeks    Please resubmit prior auth (Humira is on preferred list)

## 2024-10-25 NOTE — TELEPHONE ENCOUNTER
PA for Humira SUBMITTED     via    [x][facility]-KEY: [id number]  []Surescripts-Case ID #    []Availity-Auth ID #  NDC #    []Faxed to plan   []Other website    []Phone call Case ID #      Office notes sent, clinical questions answered. Awaiting determination    Turnaround time for your insurance to make a decision on your Prior Authorization can take 7-21 business days.

## 2024-11-04 ENCOUNTER — IMMUNIZATIONS (OUTPATIENT)
Dept: FAMILY MEDICINE CLINIC | Facility: CLINIC | Age: 39
End: 2024-11-04
Payer: MEDICARE

## 2024-11-04 DIAGNOSIS — Z23 ENCOUNTER FOR IMMUNIZATION: Primary | ICD-10-CM

## 2024-11-04 PROCEDURE — 90656 IIV3 VACC NO PRSV 0.5 ML IM: CPT | Performed by: INTERNAL MEDICINE

## 2024-11-04 PROCEDURE — 90471 IMMUNIZATION ADMIN: CPT | Performed by: INTERNAL MEDICINE

## 2024-11-13 ENCOUNTER — TELEPHONE (OUTPATIENT)
Dept: RHEUMATOLOGY | Facility: CLINIC | Age: 39
End: 2024-11-13

## 2024-11-13 ENCOUNTER — TELEPHONE (OUTPATIENT)
Age: 39
End: 2024-11-13

## 2024-11-13 DIAGNOSIS — M05.79 RHEUMATOID ARTHRITIS INVOLVING MULTIPLE SITES WITH POSITIVE RHEUMATOID FACTOR (HCC): Primary | ICD-10-CM

## 2024-11-13 DIAGNOSIS — M05.79 RHEUMATOID ARTHRITIS INVOLVING MULTIPLE SITES WITH POSITIVE RHEUMATOID FACTOR (HCC): ICD-10-CM

## 2024-11-13 RX ORDER — ADALIMUMAB 40MG/0.4ML
40 KIT SUBCUTANEOUS
Qty: 2 EACH | Refills: 6 | Status: SHIPPED | OUTPATIENT
Start: 2024-11-13 | End: 2024-11-13 | Stop reason: SDUPTHER

## 2024-11-13 RX ORDER — ADALIMUMAB 40MG/0.4ML
40 KIT SUBCUTANEOUS
Qty: 2 EACH | Refills: 6 | Status: SHIPPED | OUTPATIENT
Start: 2024-11-13

## 2024-11-13 NOTE — TELEPHONE ENCOUNTER
Snow from [facility] requesting script for Humira be faxed to :     FAX [phone]

## 2024-11-21 NOTE — TELEPHONE ENCOUNTER
Patient called in because she got a letter in the mail that her injection was approved. She wants to know when she will start taking the medication and if we will contact her for an appt in come to the office. Please advise.

## 2024-12-02 ENCOUNTER — TELEPHONE (OUTPATIENT)
Age: 39
End: 2024-12-02

## 2024-12-02 NOTE — TELEPHONE ENCOUNTER
Patient is calling because kallie picked up the Humira from her pharmacy. Patient is wanting to schedule a nurse visit so she can come in and be taught how to administer the medication.      Please advise.

## 2024-12-10 ENCOUNTER — CLINICAL SUPPORT (OUTPATIENT)
Dept: RHEUMATOLOGY | Facility: CLINIC | Age: 39
End: 2024-12-10

## 2024-12-10 VITALS
HEART RATE: 82 BPM | DIASTOLIC BLOOD PRESSURE: 62 MMHG | SYSTOLIC BLOOD PRESSURE: 126 MMHG | WEIGHT: 171 LBS | BODY MASS INDEX: 27.48 KG/M2 | HEIGHT: 66 IN | OXYGEN SATURATION: 98 %

## 2024-12-10 DIAGNOSIS — M05.79 RHEUMATOID ARTHRITIS INVOLVING MULTIPLE SITES WITH POSITIVE RHEUMATOID FACTOR (HCC): Primary | ICD-10-CM

## 2025-01-22 ENCOUNTER — OFFICE VISIT (OUTPATIENT)
Dept: FAMILY MEDICINE CLINIC | Facility: CLINIC | Age: 40
End: 2025-01-22
Payer: MEDICARE

## 2025-01-22 VITALS
DIASTOLIC BLOOD PRESSURE: 76 MMHG | TEMPERATURE: 97.6 F | WEIGHT: 171.8 LBS | RESPIRATION RATE: 16 BRPM | HEIGHT: 66 IN | OXYGEN SATURATION: 98 % | BODY MASS INDEX: 27.61 KG/M2 | HEART RATE: 81 BPM | SYSTOLIC BLOOD PRESSURE: 110 MMHG

## 2025-01-22 DIAGNOSIS — J06.9 ACUTE URI: ICD-10-CM

## 2025-01-22 DIAGNOSIS — M19.90 INFLAMMATORY ARTHRITIS: Primary | ICD-10-CM

## 2025-01-22 DIAGNOSIS — E55.9 VITAMIN D DEFICIENCY: ICD-10-CM

## 2025-01-22 DIAGNOSIS — E04.1 THYROID NODULE: ICD-10-CM

## 2025-01-22 DIAGNOSIS — R79.0 LOW FERRITIN: ICD-10-CM

## 2025-01-22 PROCEDURE — 99214 OFFICE O/P EST MOD 30 MIN: CPT | Performed by: INTERNAL MEDICINE

## 2025-01-22 RX ORDER — ERGOCALCIFEROL 1.25 MG/1
50000 CAPSULE, LIQUID FILLED ORAL WEEKLY
Qty: 12 CAPSULE | Refills: 3 | Status: SHIPPED | OUTPATIENT
Start: 2025-01-22

## 2025-01-22 RX ORDER — AMOXICILLIN 500 MG/1
500 CAPSULE ORAL EVERY 8 HOURS SCHEDULED
Qty: 30 CAPSULE | Refills: 0 | Status: SHIPPED | OUTPATIENT
Start: 2025-01-22 | End: 2025-02-01

## 2025-01-22 RX ORDER — PROMETHAZINE HYDROCHLORIDE 6.25 MG/5ML
12.5 SYRUP ORAL EVERY 6 HOURS PRN
Qty: 118 ML | Refills: 0 | Status: SHIPPED | OUTPATIENT
Start: 2025-01-22

## 2025-01-23 NOTE — PROGRESS NOTES
Name: Steph Callahan      : 1985      MRN: [id number]  Encounter Provider: Tushar Henning MD  Encounter Date: 2025   Encounter department: [facility] [place]  :  Assessment & Plan  Vitamin D deficiency    Orders:    ergocalciferol (VITAMIN D2) 50,000 units; Take 1 capsule (50,000 Units total) by mouth once a week    Inflammatory arthritis    Orders:    amoxicillin (AMOXIL) 500 mg capsule; Take 1 capsule (500 mg total) by mouth every 8 (eight) hours for 10 days    promethazine (PHENERGAN) 12.5 mg/10 mL oral solution; Take 10 mL (12.5 mg total) by mouth every 6 (six) hours as needed (cough)    UA (URINE) with reflex to Scope; Future    Magnesium; Future    Sedimentation rate, automated; Future    C-reactive protein; Future    TSH, 3rd generation; Future    T4, free; Future    CBC and differential; Future    Ferritin; Future    Iron Panel (Includes Ferritin, Iron Sat%, Iron, and TIBC); Future    Comprehensive metabolic panel; Future    Lipid panel; Future    Thyroid nodule    Orders:    amoxicillin (AMOXIL) 500 mg capsule; Take 1 capsule (500 mg total) by mouth every 8 (eight) hours for 10 days    promethazine (PHENERGAN) 12.5 mg/10 mL oral solution; Take 10 mL (12.5 mg total) by mouth every 6 (six) hours as needed (cough)    UA (URINE) with reflex to Scope; Future    Magnesium; Future    Sedimentation rate, automated; Future    C-reactive protein; Future    TSH, 3rd generation; Future    T4, free; Future    CBC and differential; Future    Ferritin; Future    Iron Panel (Includes Ferritin, Iron Sat%, Iron, and TIBC); Future    Comprehensive metabolic panel; Future    Lipid panel; Future    Low ferritin    Orders:    amoxicillin (AMOXIL) 500 mg capsule; Take 1 capsule (500 mg total) by mouth every 8 (eight) hours for 10 days    promethazine (PHENERGAN) 12.5 mg/10 mL oral solution; Take 10 mL (12.5 mg total) by mouth every 6 (six) hours as needed (cough)    UA (URINE) with reflex  to Scope; Future    Magnesium; Future    Sedimentation rate, automated; Future    C-reactive protein; Future    TSH, 3rd generation; Future    T4, free; Future    CBC and differential; Future    Ferritin; Future    Iron Panel (Includes Ferritin, Iron Sat%, Iron, and TIBC); Future    Comprehensive metabolic panel; Future    Lipid panel; Future    Acute URI    Orders:    amoxicillin (AMOXIL) 500 mg capsule; Take 1 capsule (500 mg total) by mouth every 8 (eight) hours for 10 days    promethazine (PHENERGAN) 12.5 mg/10 mL oral solution; Take 10 mL (12.5 mg total) by mouth every 6 (six) hours as needed (cough)    UA (URINE) with reflex to Scope; Future    Magnesium; Future    Sedimentation rate, automated; Future    C-reactive protein; Future    TSH, 3rd generation; Future    T4, free; Future    CBC and differential; Future    Ferritin; Future    Iron Panel (Includes Ferritin, Iron Sat%, Iron, and TIBC); Future    Comprehensive metabolic panel; Future    Lipid panel; Future    Bed rest  Increase Po fluids  Life style mod  FU w Rheumatology  RTC in 3 mos w Blood work       History of Present Illness   39 Y O Lady is here for Regular check up, she has few symptoms, No recent Blood work, med list reviewed,...      Review of Systems   Constitutional:  Negative for chills, fatigue and fever.   HENT:  Positive for congestion, postnasal drip and sore throat. Negative for ear pain, facial swelling, trouble swallowing and voice change.    Eyes:  Negative for pain, discharge and visual disturbance.   Respiratory:  Positive for cough. Negative for shortness of breath and wheezing.    Cardiovascular:  Negative for chest pain, palpitations and leg swelling.   Gastrointestinal:  Negative for abdominal pain, blood in stool, constipation, diarrhea, nausea and vomiting.   Endocrine: Negative for polydipsia, polyphagia and polyuria.   Genitourinary:  Negative for difficulty urinating, dysuria, hematuria and urgency.   Musculoskeletal:   "Positive for arthralgias. Negative for back pain and myalgias.   Skin:  Negative for color change and rash.   Neurological:  Negative for dizziness, tremors, seizures, syncope, weakness and headaches.   Hematological:  Negative for adenopathy. Does not bruise/bleed easily.   Psychiatric/Behavioral:  Negative for dysphoric mood, sleep disturbance and suicidal ideas.    All other systems reviewed and are negative.      Objective   /76 (BP Location: Left arm, Patient Position: Sitting, Cuff Size: Standard)   Pulse 81   Temp 97.6 °F (36.4 °C) (Tympanic)   Resp 16   Ht 5' 6\" (1.676 m)   Wt 77.9 kg (171 lb 12.8 oz)   SpO2 98%   BMI 27.73 kg/m²      Physical Exam  Vitals and nursing note reviewed.   Constitutional:       General: She is not in acute distress.     Appearance: She is well-developed. She is not diaphoretic.   HENT:      Head: Normocephalic and atraumatic.      Right Ear: External ear normal.      Left Ear: External ear normal.      Nose: Nose normal.   Eyes:      General:         Right eye: No discharge.         Left eye: No discharge.      Conjunctiva/sclera: Conjunctivae normal.      Pupils: Pupils are equal, round, and reactive to light.   Neck:      Thyroid: No thyromegaly.      Trachea: No tracheal deviation.   Cardiovascular:      Rate and Rhythm: Normal rate and regular rhythm.      Heart sounds: Normal heart sounds. No murmur heard.     No friction rub.   Pulmonary:      Effort: Pulmonary effort is normal. No respiratory distress.      Breath sounds: No stridor. Rhonchi present. No wheezing or rales.   Abdominal:      General: Bowel sounds are normal. There is no distension.      Palpations: Abdomen is soft.      Tenderness: There is no abdominal tenderness. There is no guarding.   Musculoskeletal:         General: Tenderness present. No swelling or deformity. Normal range of motion.      Cervical back: Normal range of motion and neck supple.   Lymphadenopathy:      Cervical: No cervical " adenopathy.   Skin:     General: Skin is warm and dry.      Capillary Refill: Capillary refill takes less than 2 seconds.      Coloration: Skin is not pale.      Findings: No erythema or rash.   Neurological:      Mental Status: She is alert and oriented to person, place, and time.      Cranial Nerves: No cranial nerve deficit.      Coordination: Coordination normal.   Psychiatric:         Mood and Affect: Mood normal.         Behavior: Behavior normal.

## 2025-02-25 ENCOUNTER — TELEPHONE (OUTPATIENT)
Age: 40
End: 2025-02-25

## 2025-02-25 NOTE — TELEPHONE ENCOUNTER
Antony pharmacist from [facility] called to inform provider that pt has stopped taking the Humira as it is not working and refusing to accept any shipments. He is going to discontinue the order from his end, but if any changes to notify the pharmacy or send new script.

## 2025-04-12 ENCOUNTER — HOSPITAL ENCOUNTER (EMERGENCY)
Facility: HOSPITAL | Age: 40
Discharge: HOME | End: 2025-04-12
Attending: EMERGENCY MEDICINE
Payer: MEDICARE

## 2025-04-12 VITALS
SYSTOLIC BLOOD PRESSURE: 130 MMHG | TEMPERATURE: 98.7 F | OXYGEN SATURATION: 98 % | HEART RATE: 74 BPM | RESPIRATION RATE: 18 BRPM | DIASTOLIC BLOOD PRESSURE: 63 MMHG

## 2025-04-12 DIAGNOSIS — J06.9 URI (UPPER RESPIRATORY INFECTION): Primary | ICD-10-CM

## 2025-04-12 PROCEDURE — 99284 EMERGENCY DEPT VISIT MOD MDM: CPT

## 2025-04-12 PROCEDURE — 99284 EMERGENCY DEPT VISIT MOD MDM: CPT | Performed by: EMERGENCY MEDICINE

## 2025-04-12 RX ORDER — CETIRIZINE HYDROCHLORIDE 10 MG/1
10 TABLET ORAL DAILY
Qty: 30 TABLET | Refills: 0 | Status: SHIPPED | OUTPATIENT
Start: 2025-04-12 | End: 2025-05-12

## 2025-04-12 RX ADMIN — DEXAMETHASONE SODIUM PHOSPHATE 10 MG: 10 INJECTION, SOLUTION INTRAMUSCULAR; INTRAVENOUS at 21:46

## 2025-04-13 NOTE — ED PROVIDER NOTES
Time reflects when diagnosis was documented in both MDM as applicable and the Disposition within this note       Time User Action Codes Description Comment    2025  9:42 PM Nel White Add [J06.9] URI (upper respiratory infection)           ED Disposition       ED Disposition   Discharge    Condition   Stable    Date/Time   Sat 2025  9:42 PM    Comment   Steph Callahan discharge to home/self care.                   Assessment & Plan       Medical Decision Making  A 39-year-old female presents with cough and sore throat over the past 5 days.  Likely URI, possible allergic component.  Will treat with a dose of Decadron.  Will provide a 5d course of Augmentin given length of symptoms.  Discussed starting Cetirizine for allergies, pt in agreement.    Risk  OTC drugs.  Prescription drug management.             Medications   dexamethasone oral liquid 10 mg 1 mL (10 mg Oral Given 25)       ED Risk Strat Scores                    No data recorded                            History of Present Illness       Chief Complaint   Patient presents with    Sore Throat     Pt reports coughing and sore throat for x5 days. Pt also reports bodyaches. Augmentin for with no relief. Tylenol pta.       Past Medical History:   Diagnosis Date    Anemia     Canker sores oral     Chickenpox     Pap smear for cervical cancer screening     -wnl per pt; 2022-wnl, HRHPV neg    Seasonal allergies       Past Surgical History:   Procedure Laterality Date     SECTION  2021     shortly after birth--elective C/S at 25 weks due to multiple medical problems with fetus.    NM  DELIVERY ONLY N/A 2023    Procedure:  SECTION () REPEAT;  Surgeon: Mandy Weber MD;  Location: [facility];  Service: Obstetrics    WISDOM TOOTH EXTRACTION            Family History   Problem Relation Age of Onset    Hypertension Mother     Hyperlipidemia Mother     Heart disease Father         CABG     Supraventricular tachycardia Father         required ablation    No Known Problems Sister     No Known Problems Brother     Hypertension Maternal Grandmother     Deafness Maternal Grandmother         elderly--age 96    Dementia Maternal Grandmother     Hypertension Maternal Grandfather     Prostate cancer Maternal Grandfather     Hypertension Paternal Grandmother     Hyperlipidemia Paternal Grandmother     Diabetes type II Paternal Grandmother     Heart attack Paternal Grandfather     Stroke Paternal Grandfather         with paralysis    Breast cancer Neg Hx     Ovarian cancer Neg Hx     Colon cancer Neg Hx       Social History     Tobacco Use    Smoking status: Some Days     Types: Pipe    Smokeless tobacco: Current    Tobacco comments:     Hookah    Vaping Use    Vaping status: Never Used   Substance Use Topics    Alcohol use: Not Currently     Comment: socially    Drug use: Never      E-Cigarette/Vaping    E-Cigarette Use Never User       E-Cigarette/Vaping Substances    Nicotine No     THC No     CBD No     Flavoring No     Other No     Unknown No       I have reviewed and agree with the history as documented.     A 39-year-old female with past medical history of inflammatory arthritis; presents with a cough and dry scratchy throat over the past 5 days.  She has been taking Augmentin which she had at home.  She does report her symptoms are worse in the morning.  Patient is otherwise not had fever, chills, chest pain, shortness of breath, abdominal pain, vomiting, diarrhea, peripheral edema and rashes.  Her son is sick with URI symptoms.      History provided by:  Patient and medical records      Review of Systems   HENT:  Positive for sore throat.    Respiratory:  Positive for cough.    All other systems reviewed and are negative.      Objective       ED Triage Vitals [04/12/25 2129]   Temperature Pulse Blood Pressure Respirations SpO2 Patient Position - Orthostatic VS   98.7 °F (37.1 °C) 74 130/63 18 98 % Lying       Temp Source Heart Rate Source BP Location FiO2 (%) Pain Score    Oral Monitor Right arm -- --      Vitals      Date and Time Temp Pulse SpO2 Resp BP Pain Score FACES Pain Rating User   04/12/25 2129 98.7 °F (37.1 °C) 74 98 % 18 130/63 -- -- DF            Physical Exam  General Appearance: alert and oriented, nad, non toxic appearing  Skin:  Warm, dry, intact.  No cyanosis  HEENT: Atraumatic, normocephalic.  TM's WNL.  No posterior oropharynx erythema.  No eye drainage.  Normal hearing.  Moist mucous membranes.    Neck: Supple, trachea midline.  No cervical lymphadenopathy.  Cardiac: RRR; no murmurs, rub, gallops.  No pedal edema, 2+ pulses  Pulmonary: lungs CTAB; no wheezes, rales, rhonchi  Gastrointestinal: abdomen soft, nontender, nondistended; no guarding or rebound tenderness; good bowel sounds, no mass or bruits  Extremities:  No deformities.  No calf tenderness, no clubbing  Neuro:  no focal motor or sensory deficits, CN 2-12 grossly intact  Psych:  Normal mood and affect, normal judgement and insight       Results Reviewed       None            No orders to display       Procedures    ED Medication and Procedure Management   Prior to Admission Medications   Prescriptions Last Dose Informant Patient Reported? Taking?   adalimumab (Humira, 2 Syringe,) 40 MG/0.4ML   No No   Sig: Inject 0.4 mL (40 mg total) under the skin every 14 (fourteen) days   ergocalciferol (VITAMIN D2) 50,000 units   No No   Sig: Take 1 capsule (50,000 Units total) by mouth once a week   promethazine (PHENERGAN) 12.5 mg/10 mL oral solution   No No   Sig: Take 10 mL (12.5 mg total) by mouth every 6 (six) hours as needed (cough)      Facility-Administered Medications: None     Discharge Medication List as of 4/12/2025  9:43 PM        START taking these medications    Details   amoxicillin-clavulanate (AUGMENTIN) 875-125 mg per tablet Take 1 tablet by mouth every 12 (twelve) hours for 5 days, Starting Sat 4/12/2025, Until Thu 4/17/2025,  Normal      cetirizine (ZyrTEC) 10 mg tablet Take 1 tablet (10 mg total) by mouth daily, Starting Sat 4/12/2025, Until Mon 5/12/2025, Normal           CONTINUE these medications which have NOT CHANGED    Details   adalimumab (Humira, 2 Syringe,) 40 MG/0.4ML Inject 0.4 mL (40 mg total) under the skin every 14 (fourteen) days, Starting Wed 11/13/2024, Print      ergocalciferol (VITAMIN D2) 50,000 units Take 1 capsule (50,000 Units total) by mouth once a week, Starting Wed 1/22/2025, Normal      promethazine (PHENERGAN) 12.5 mg/10 mL oral solution Take 10 mL (12.5 mg total) by mouth every 6 (six) hours as needed (cough), Starting Wed 1/22/2025, Normal           No discharge procedures on file.  ED SEPSIS DOCUMENTATION   Time reflects when diagnosis was documented in both MDM as applicable and the Disposition within this note       Time User Action Codes Description Comment    4/12/2025  9:42 PM Nel White [J06.9] URI (upper respiratory infection)                  Nel White DO  04/13/25 0540

## (undated) DEVICE — GLOVE INDICATOR PI UNDERGLOVE SZ 7.5 BLUE

## (undated) DEVICE — PACK C-SECTION PBDS

## (undated) DEVICE — GLOVE SRG BIOGEL ECLIPSE 7

## (undated) DEVICE — SWABSTCK, BENZOIN TINCTURE, 1/PK, STRL: Brand: APLICARE

## (undated) DEVICE — SUT VICRYL 0 CT-1 36 IN J946H

## (undated) DEVICE — SCD SEQUENTIAL COMPRESSION COMFORT SLEEVE MEDIUM KNEE LENGTH: Brand: KENDALL SCD

## (undated) DEVICE — SUT MONOCRYL 4-0 PS-2 27 IN Y426H

## (undated) DEVICE — ABG MICROSTICKS SAFETY

## (undated) DEVICE — CHLORAPREP HI-LITE 26ML ORANGE

## (undated) DEVICE — 3M™ STERI-STRIP™ REINFORCED ADHESIVE SKIN CLOSURES, R1547, 1/2 IN X 4 IN (12 MM X 100 MM), 6 STRIPS/ENVELOPE: Brand: 3M™ STERI-STRIP™